# Patient Record
Sex: FEMALE | Race: BLACK OR AFRICAN AMERICAN | NOT HISPANIC OR LATINO | Employment: FULL TIME | URBAN - METROPOLITAN AREA
[De-identification: names, ages, dates, MRNs, and addresses within clinical notes are randomized per-mention and may not be internally consistent; named-entity substitution may affect disease eponyms.]

---

## 2021-11-01 ENCOUNTER — LAB REQUISITION (OUTPATIENT)
Dept: LAB | Facility: CLINIC | Age: 26
End: 2021-11-01

## 2021-11-01 PROCEDURE — 86706 HEP B SURFACE ANTIBODY: CPT | Performed by: INTERNAL MEDICINE

## 2021-11-01 PROCEDURE — 86787 VARICELLA-ZOSTER ANTIBODY: CPT | Performed by: INTERNAL MEDICINE

## 2021-11-01 PROCEDURE — 87340 HEPATITIS B SURFACE AG IA: CPT | Performed by: INTERNAL MEDICINE

## 2021-11-01 PROCEDURE — 86481 TB AG RESPONSE T-CELL SUSP: CPT | Performed by: INTERNAL MEDICINE

## 2021-11-02 LAB
HBV SURFACE AB SERPL IA-ACNC: 7.01 M[IU]/ML
HBV SURFACE AG SERPL QL IA: NONREACTIVE
QUANTIFERON MITOGEN: 10 IU/ML
QUANTIFERON NIL TUBE: 0.19 IU/ML
QUANTIFERON TB1 TUBE: 0.29 IU/ML
QUANTIFERON TB2 TUBE: 0.2
VZV IGG SER QL IA: 213 INDEX
VZV IGG SER QL IA: POSITIVE

## 2021-11-03 LAB
GAMMA INTERFERON BACKGROUND BLD IA-ACNC: 0.19 IU/ML
M TB IFN-G BLD-IMP: NEGATIVE
M TB IFN-G CD4+ BCKGRND COR BLD-ACNC: 9.81 IU/ML
MITOGEN IGNF BCKGRD COR BLD-ACNC: 0.01 IU/ML
MITOGEN IGNF BCKGRD COR BLD-ACNC: 0.1 IU/ML

## 2021-12-30 ENCOUNTER — LAB (OUTPATIENT)
Dept: LAB | Facility: CLINIC | Age: 26
End: 2021-12-30

## 2021-12-30 DIAGNOSIS — Z11.52 ENCOUNTER FOR SCREENING FOR COVID-19: Primary | ICD-10-CM

## 2021-12-30 DIAGNOSIS — Z11.52 ENCOUNTER FOR SCREENING FOR COVID-19: ICD-10-CM

## 2021-12-30 LAB — SARS-COV-2 RNA RESP QL NAA+PROBE: NEGATIVE

## 2021-12-30 PROCEDURE — U0003 INFECTIOUS AGENT DETECTION BY NUCLEIC ACID (DNA OR RNA); SEVERE ACUTE RESPIRATORY SYNDROME CORONAVIRUS 2 (SARS-COV-2) (CORONAVIRUS DISEASE [COVID-19]), AMPLIFIED PROBE TECHNIQUE, MAKING USE OF HIGH THROUGHPUT TECHNOLOGIES AS DESCRIBED BY CMS-2020-01-R: HCPCS

## 2021-12-30 PROCEDURE — U0005 INFEC AGEN DETEC AMPLI PROBE: HCPCS

## 2022-01-05 ENCOUNTER — APPOINTMENT (OUTPATIENT)
Dept: FAMILY MEDICINE | Facility: CLINIC | Age: 27
End: 2022-01-05
Payer: COMMERCIAL

## 2022-01-05 ENCOUNTER — LAB REQUISITION (OUTPATIENT)
Dept: LAB | Facility: CLINIC | Age: 27
End: 2022-01-05

## 2022-01-05 LAB — SARS-COV-2 RNA RESP QL NAA+PROBE: POSITIVE

## 2022-01-05 PROCEDURE — U0005 INFEC AGEN DETEC AMPLI PROBE: HCPCS | Performed by: INTERNAL MEDICINE

## 2022-01-12 ENCOUNTER — TELEPHONE (OUTPATIENT)
Dept: OBGYN | Facility: CLINIC | Age: 27
End: 2022-01-12
Payer: COMMERCIAL

## 2022-01-12 NOTE — LETTER
Binu Sunshine  [address]  [facility] [address]    Date: 1/12/2022    TO WHOM IT MAY CONCERN:    Binu Sunshine had recent COVID-19 infection but is cleared to travel by airplane on 1/13/2022.  She has been medically evaluated, she is asymptomatic and more than 10 days have passed since symptoms started.     Sincerely,        Kaity Barr MD   1/12/2022  1:28 PM

## 2022-01-12 NOTE — TELEPHONE ENCOUNTER
Binu Sunshine is a 26 year old who had recent breakthrough COVID 19 infection.  Symptoms started 1/3. She tested positive 1/5. She is currently asymptomatic. She plans to travel tomorrow 1/13 which will be 10 days after sx started. Letter written.   Kaity Barr MD

## 2022-02-06 ENCOUNTER — HEALTH MAINTENANCE LETTER (OUTPATIENT)
Age: 27
End: 2022-02-06

## 2022-07-01 ENCOUNTER — DOCUMENTATION ONLY (OUTPATIENT)
Dept: ORTHOPEDICS | Facility: CLINIC | Age: 27
End: 2022-07-01

## 2022-07-01 ENCOUNTER — MEDICAL CORRESPONDENCE (OUTPATIENT)
Dept: HEALTH INFORMATION MANAGEMENT | Facility: CLINIC | Age: 27
End: 2022-07-01

## 2022-07-01 ENCOUNTER — TELEPHONE (OUTPATIENT)
Dept: ORTHOPEDICS | Facility: CLINIC | Age: 27
End: 2022-07-01

## 2022-07-01 NOTE — TELEPHONE ENCOUNTER
[facility]    Phone Message:  Pt called.  Pt was seen, today, at:    [facility]    [address] [place] [address] [id number] [phone]     By:    Catrina Luu PA-C    [address] [address]    [phone] (Work)    [phone] (Fax)      Appt Notes:    Patient reports left leg weakness has been chronic for past several months, low suspicion for acute CVA today. Patient exhibited no signs of expressive aphasia, slurred speech or confusion during telephone visit today. Recommend patient schedule follow-up clinic visit within the next 24 hours to evaluate the symptoms. Patient given strict ED precautions and told to present immediately or call 911 if she experiences any worsening or strokelike symptoms.    Pt referrred to [facility]    Writer contact [facility] Priority Line. More information and follow up was needed. Writer contacted     [facility]    [address] [place] [id number] [phone]     The clinic above is going to fax [facility] the referral and Notes from today.    NO IMAGING WAS DONE TODAY.  THE PT AND THE LISTED CLINIC STATED THAT PT'S LAST IMAGING WAS A MRI, DONE ONE YEAR AGO.    Please reach out to pt if pt is appropriate for [facility] provider, or if pt is supposed to be seen elsewhere. Thank you.    May a detailed message be left on voicemail: Yes     Reason for Call: Other: LEFT Leg Weakness, Compartment Syndrome     Action Taken: Message routed to:  [facility] ([facility] Team     Travel Screening: Not Applicable

## 2022-07-01 NOTE — TELEPHONE ENCOUNTER
Writer called and talked with patient on the phone. Writer explained that at this time pt should be seen by their primary care provider and or a sports medicine non-op provider to have a work up of the limb that is having the issue. Writer stated that updated imaging would be needed such as an MRI. Pt agreed with plan and will follow up with the orthopedic team that she has worked with and have them do a work up and updated imaging. Writer then gave pt the number to the clinic and informed her to have all records and imaging sent to the clinic along with a referral. PT agreed with plan.   Writer did confirm with PA of Day and clinic manager that this is right course of action. Pt will call back if needs to be scheduled.     Jaharia Lakhani LPN

## 2022-07-01 NOTE — PROGRESS NOTES
Writer talked with brendon from call center. Pt is calling in stating is to be seen in Ortho after being seen at [facility] today to rule out a CVA. Brendon was stating the referral was for leg weakness and compartment syndrome and CVA. Pt states no new imaging was taken and writer was uncertain if a new injury occurred. Writer talked with Brendon and stated that [facility] would need to send referral and records over before scheduling pt as it is unclear if Orthopedics is the right clinic for pt to be seen in.     Jahaira Lakhani LPN

## 2022-07-06 ENCOUNTER — TRANSCRIBE ORDERS (OUTPATIENT)
Dept: OTHER | Age: 27
End: 2022-07-06

## 2022-07-06 DIAGNOSIS — M79.605 LEFT LEG PAIN: Primary | ICD-10-CM

## 2022-07-21 ENCOUNTER — PRE VISIT (OUTPATIENT)
Dept: ORTHOPEDICS | Facility: CLINIC | Age: 27
End: 2022-07-21

## 2022-07-21 ENCOUNTER — OFFICE VISIT (OUTPATIENT)
Dept: ORTHOPEDICS | Facility: CLINIC | Age: 27
End: 2022-07-21
Payer: COMMERCIAL

## 2022-07-21 VITALS — WEIGHT: 140 LBS | HEIGHT: 64 IN | BODY MASS INDEX: 23.9 KG/M2

## 2022-07-21 DIAGNOSIS — M62.81 CALF MUSCLE WEAKNESS: Primary | ICD-10-CM

## 2022-07-21 DIAGNOSIS — M79.605 LEFT LEG PAIN: ICD-10-CM

## 2022-07-21 PROCEDURE — 99203 OFFICE O/P NEW LOW 30 MIN: CPT | Mod: GC | Performed by: FAMILY MEDICINE

## 2022-07-21 NOTE — PROGRESS NOTES
ASSESSMENT/PLAN:    (M62.81) Calf muscle weakness  (primary encounter diagnosis)  Comment: exam and imaging are concerning for soleus atrophy, likely secondary to tibial nerve entrapment; will check MRI calf to further evaluate for structural cause of tibial nerve compression; if negative, would consider popliteal artery entrapment eval vs EMG; she will follow-up with me after the MRI; I will review her case w/ Dr Vazquez as well  Plan: MR Tibia Fibula Lower Leg Left wo Contrast, EMG        ([facility]), US Low Ext Arterial Dop-Seg         Pressures with Exercise          (M79.605) Left leg pain  Comment: see above   Plan: MR Tibia Fibula Lower Leg Left wo Contrast          Attestation:  This patient has been seen and evaluated by me, Brandon Felder MD with the resident, Dr Dorado and the care team. I agree with the findings and plan of care as documented in this note.    Brandon Felder MD  July 21, 2022  10:26 AM      Pt is a 26 year old female here today for:     L Lower leg pain:   Location: posterior (whole calf)   Duration: 2 years    Injury/ Inciting Activity? No -> pain w/ running and soccer; 20 minutes into run, then resolves in 10 min   Previous stress fracture? NOShin Splints? NO Compartment syndrome?NO   Swelling? YES    Weakness? YES - catie after running   Numbness/ Tingling? YES   Footdrop? NO but cannot get up on toes after running   Imaging? MRI calf - see below  Testing? LYLE - 9/24/2021: IMPRESSION   Impression: Normal ankle-brachial indices bilaterally.   Treatment? PT     MRI ankle - 7/9/2022   Impression: Diffuse fatty atrophy of the soleus muscle. The extent of atrophy suggests a long-standing process, possibly related to tibial nerve injury versus entrapment.      No past medical history on file.   No past surgical history on file.   No current outpatient medications on file.      Not on File   ROS:   Gen- no fevers/chills   Rheum - no morning stiffness   Derm - no rash/ redness   Neuro - no  numbness, no tingling   Remainder of ROS negative.     Exam:   There were no vitals taken for this visit.     L Shin/leg:   Sensation intact   Full ROM at ankle and knee   Weakness w/ plantarflexion in knee flexion, improved w/ knee extension  No fascial herniations   No TTP over tibia/ fibula   Neg hop

## 2022-07-21 NOTE — LETTER
7/21/2022      RE: Binu Sunshine  [address]  [facility] [address]     Dear Colleague,    Thank you for referring your patient, Binu Sunshine, to the [facility] [place]. Please see a copy of my visit note below.    ASSESSMENT/PLAN:    (M62.81) Calf muscle weakness  (primary encounter diagnosis)  Comment: exam and imaging are concerning for soleus atrophy, likely secondary to tibial nerve entrapment; will check MRI calf to further evaluate for structural cause of tibial nerve compression; if negative, would consider popliteal artery entrapment eval vs EMG; she will follow-up with me after the MRI; I will review her case w/ Dr Vazquez as well  Plan: MR Tibia Fibula Lower Leg Left wo Contrast, EMG        ([facility]), US Low Ext Arterial Dop-Seg         Pressures with Exercise          (M79.605) Left leg pain  Comment: see above   Plan: MR Tibia Fibula Lower Leg Left wo Contrast          Attestation:  This patient has been seen and evaluated by me, Brandon Felder MD with the resident, Dr Dorado and the care team. I agree with the findings and plan of care as documented in this note.    Brandon Felder MD  July 21, 2022  10:26 AM      Pt is a 26 year old female here today for:     L Lower leg pain:   Location: posterior (whole calf)   Duration: 2 years    Injury/ Inciting Activity? No -> pain w/ running and soccer; 20 minutes into run, then resolves in 10 min   Previous stress fracture? NOShin Splints? NO Compartment syndrome?NO   Swelling? YES    Weakness? YES - catie after running   Numbness/ Tingling? YES   Footdrop? NO but cannot get up on toes after running   Imaging? MRI calf - see below  Testing? LYLE - 9/24/2021: IMPRESSION   Impression: Normal ankle-brachial indices bilaterally.   Treatment? PT     MRI ankle - 7/9/2022   Impression: Diffuse fatty atrophy of the soleus muscle. The extent of atrophy suggests a long-standing process, possibly related to tibial  nerve injury versus entrapment.      No past medical history on file.   No past surgical history on file.   No current outpatient medications on file.      Not on File   ROS:   Gen- no fevers/chills   Rheum - no morning stiffness   Derm - no rash/ redness   Neuro - no numbness, no tingling   Remainder of ROS negative.     Exam:    There were no vitals taken for this visit.     L Shin/leg:   Sensation intact   Full ROM at ankle and knee   Weakness w/ plantarflexion in knee flexion, improved w/ knee extension  No fascial herniations   No TTP over tibia/ fibula   Neg hop         Again, thank you for allowing me to participate in the care of your patient.      Sincerely,    Brandon Felder MD

## 2022-08-08 ENCOUNTER — ANCILLARY PROCEDURE (OUTPATIENT)
Dept: MRI IMAGING | Facility: CLINIC | Age: 27
End: 2022-08-08
Attending: FAMILY MEDICINE
Payer: COMMERCIAL

## 2022-08-08 ENCOUNTER — TELEPHONE (OUTPATIENT)
Dept: ORTHOPEDICS | Facility: CLINIC | Age: 27
End: 2022-08-08

## 2022-08-08 ENCOUNTER — ANCILLARY PROCEDURE (OUTPATIENT)
Dept: ULTRASOUND IMAGING | Facility: CLINIC | Age: 27
End: 2022-08-08
Attending: FAMILY MEDICINE
Payer: COMMERCIAL

## 2022-08-08 DIAGNOSIS — M62.81 CALF MUSCLE WEAKNESS: ICD-10-CM

## 2022-08-08 DIAGNOSIS — M79.605 LEFT LEG PAIN: ICD-10-CM

## 2022-08-08 DIAGNOSIS — I77.89 POPLITEAL ARTERY ENTRAPMENT SYNDROME (H): Primary | ICD-10-CM

## 2022-08-08 PROCEDURE — 73718 MRI LOWER EXTREMITY W/O DYE: CPT | Mod: LT | Performed by: RADIOLOGY

## 2022-08-08 PROCEDURE — 93924 LWR XTR VASC STDY BILAT: CPT | Performed by: RADIOLOGY

## 2022-08-08 NOTE — TELEPHONE ENCOUNTER
[facility] Call Center    Phone Message    May a detailed message be left on voicemail: yes     Reason for Call: Order(s): Other:   Reason for requested:  Pt calling to schedule - popliteal artery entrapment eval    Date needed: 08/09/2022    Provider name: Dr. Brandon Felder    Pt had MRI / US done 08/08.  Pt called to be schedule for a popliteal artery entrapment test. No order or referral from Dr. Felder.  Pt would like call back with who she can contact to get this test done.     Pt can be reached at [phone]      Action Taken: Other: Sports Med - Dr. Brandon Felder     Travel Screening: Not Applicable

## 2022-08-09 NOTE — TELEPHONE ENCOUNTER
Team - please contact pt and inform her that the new study is ordered. I have reached out to the radiology dept to ensure the study is performed to evaluate for popliteal artery entrapment. Thanks!    Brandon Felder MD  August 9, 2022  12:56 PM

## 2022-08-09 NOTE — TELEPHONE ENCOUNTER
I left a message for the patient with the number to schedule the evaluation. Call back number was given for further questions.     Caitie, SHARYN

## 2022-08-10 ENCOUNTER — ANCILLARY PROCEDURE (OUTPATIENT)
Dept: ULTRASOUND IMAGING | Facility: CLINIC | Age: 27
End: 2022-08-10
Attending: FAMILY MEDICINE
Payer: COMMERCIAL

## 2022-08-10 DIAGNOSIS — I77.89 POPLITEAL ARTERY ENTRAPMENT SYNDROME (H): ICD-10-CM

## 2022-08-10 PROCEDURE — 93926 LOWER EXTREMITY STUDY: CPT | Mod: LT | Performed by: RADIOLOGY

## 2022-08-11 ENCOUNTER — TELEPHONE (OUTPATIENT)
Dept: ORTHOPEDICS | Facility: CLINIC | Age: 27
End: 2022-08-11

## 2022-08-11 NOTE — TELEPHONE ENCOUNTER
"Left voicemail w/ results of MRI and vascular studies. MRI shows soleus atrophy and possible tibial nerve compression at the \"soleus sling\"? I spoke with our foot/ankle surgeon Dr Vazquez and he was not familiar with this term but assumed it was at the origin of the muscle. He would like to get an EMG to confirm the level of nerve entrapment before he would proceed with a release. The vascular study was consistent w/ popliteal artery entrapment as well. I offered a vascular surgery consult vs the plan to wait on the EMG before seeing any surgeon. She will call back is she wishes for me to place a vascular consult. Otherwise, we can discuss further at her follow-up on 8/25/22.    Team - just an JENNIFER Felder MD  August 11, 2022  1:22 PM    "

## 2022-08-11 NOTE — TELEPHONE ENCOUNTER
[facility] Call Center    Phone Message:  Pt called back, returning the call of MD Emerita.     Pt would like to WAIT until her appt with MD Emerita >> on 8.25.22 >> to discuss the Vascular consult.    Thank you.    May a detailed message be left on voicemail: Yes     Reason for Call: Other: Patient Returned MD Call     Action Taken: Message routed to:  [facility] Team to MD    Travel Screening: Not Applicable

## 2022-08-22 ENCOUNTER — TELEPHONE (OUTPATIENT)
Dept: ORTHOPEDICS | Facility: CLINIC | Age: 27
End: 2022-08-22

## 2022-08-22 NOTE — TELEPHONE ENCOUNTER
[facility] Call Center    Phone Message    May a detailed message be left on voicemail: no     Reason for Call: Other: Patient returning msg. Yes she would like a referral for neurosurgery    Action Taken: Message routed to:  [facility]): DAT SPORTS    Travel Screening: Not Applicable

## 2022-08-24 NOTE — PROGRESS NOTES
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 She has also started to having cramps in her lower leg.  Only able to walk 2.5 miles   Has timing question -> Plan to start new job on Monday     Arterial U/S 8/10/22:  FINDINGS: LEFT popliteal artery     Neutral:       Above joint: 6.7 mm, 101/0 cm/s, triphasic       At joint: 7.1 mm, 88/0 cm/s, triphasic       Below joint: 5.8 mm, 52/0 cm/s, triphasic     Dorsiflexion:       Above joint: 113/0 cm/s, triphasic       At joint: 121/0 cm/s, triphasic       Below joint: 57/0 cm/s, triphasic     Plantar flexion:       Above joint: 54/0 cm/s, triphasic       At joint: 128/0 cm/s, triphasic       Below joint: 223/0 cm/s, triphasic, 193/0 cm/s, triphasic       Standin/0 cm/s, triphasic.                                                                      IMPRESSION: No occlusion demonstrated.      Velocity in the left below knee popliteal artery more than quadrupled  from neutral with plantar flexion suggesting compression and popliteal  entrapment.      MRI could be performed for better evaluation of the vascular and  muscular anatomy.     MRI L leg 2022     Findings:   External markers placed along the anterolateral and posterolateral mid  lower leg.     Mild thickening of the soleus muscle sling with focal thickening and  abnormal T2 hyperintense signal at the tibial nerve approximately 5 cm  below the tibial plateau (series [id number] image 15).     Osseous structures   Osseous structures: No fracture avascular necrosis, or focal osseous  lesion is seen.     Muscles   Muscles: Soleus muscle edema and moderate fatty atrophy. Mild  gastrocnemius muscle belly edema and moderate fatty atrophy,  especially distally.      Joint/Periarticular soft tissue     Internal derangement of joint assessment are limited owing to chosen  field of view. Physiologic amount of joint fluid is present in the  bilateral knee. Small Baker's cyst.     Other Findings:     Evaluation contralateral leg confined to large field-of-view coronal  sequences. No fracture or marrow infiltrative change. No soft tissue  mass. No evidence of muscle atrophy                                                                      Impression:   Query soleal sling tibial nerve entrapment with focal increased signal  in the tibial nerve and adjacent thickening of the soleus sling.   *  Fatty atrophy and edema of the left soleus and, to a lesser extent,  gastrocnemius muscles.    Per my last note:  (M62.81) Calf muscle weakness  (primary encounter diagnosis)  Comment: exam and imaging are concerning for soleus atrophy, likely secondary to tibial nerve entrapment; will check MRI calf to further evaluate for structural cause of tibial nerve compression; if negative, would consider popliteal artery entrapment eval vs EMG; she will follow-up with me after the MRI; I will review her case w/ Dr Vazquez as well  Plan: MR Tibia Fibula Lower Leg Left wo Contrast, EMG        ([facility]), US Low Ext Arterial Dop-Seg         Pressures with Exercise          (M79.605) Left leg pain  Comment: see above   Plan: MR Tibia Fibula  Lower Leg Left wo Contrast    No past medical history on file.   No current outpatient medications on file.      No Known Allergies   ROS:   Gen- no fevers/chills   Rheum - no morning stiffness   Derm - no rash/ redness   Neuro - no numbness, no tingling   Remainder of ROS negative.     Exam:

## 2022-08-24 NOTE — TELEPHONE ENCOUNTER
Called pt and informed her that Dr. Felder will discuss surgery referrals tomorrow 8/25/22 at her follow up visit. Pt understood and was appreciative of the call.    Neville Key ATC

## 2022-08-24 NOTE — TELEPHONE ENCOUNTER
Team - I never discussed neurosurgery with Binu. I left a message asking if she wanted to see vascular surgery or wait til she saw me discuss possible next steps. That call was on 8/11.   Given she has an appointment with me in 2 days, I will plan to discuss further at that visit.     Team - please let Binu know we will discuss referrals to surgeons at her visit on 8/25. Thanks!    Brandon Felder MD  August 23, 2022  10:25 PM

## 2022-08-25 ENCOUNTER — TELEPHONE (OUTPATIENT)
Dept: VASCULAR SURGERY | Facility: CLINIC | Age: 27
End: 2022-08-25

## 2022-08-25 ENCOUNTER — OFFICE VISIT (OUTPATIENT)
Dept: ORTHOPEDICS | Facility: CLINIC | Age: 27
End: 2022-08-25
Payer: COMMERCIAL

## 2022-08-25 VITALS — HEIGHT: 64 IN | BODY MASS INDEX: 23.9 KG/M2 | WEIGHT: 140 LBS

## 2022-08-25 DIAGNOSIS — I77.89 POPLITEAL ARTERY ENTRAPMENT SYNDROME (H): Primary | ICD-10-CM

## 2022-08-25 DIAGNOSIS — G57.40: ICD-10-CM

## 2022-08-25 PROCEDURE — 99213 OFFICE O/P EST LOW 20 MIN: CPT | Performed by: FAMILY MEDICINE

## 2022-08-25 NOTE — TELEPHONE ENCOUNTER
Pt referred to VS for popliteal entrapment. Pt did have lower extremity US completed w/ plantar flexion:    IMPRESSION: No occlusion demonstrated.      Velocity in the left below knee popliteal artery more than quadrupled  from neutral with plantar flexion suggesting compression and popliteal  entrapment.     Will have our provider review.    LEBRON BandaN, RN  [facility]  Ph: [phone]  Fax: [phone]

## 2022-08-25 NOTE — TELEPHONE ENCOUNTER
The pt is scheduled on 8/30 at the [facility] for imaging and on 9/7 for a video visit with Ashely Chaney on 8/25/2022 at 12:49 PM     The pt was on the phone during scheduling of the above appointments and agreed to the dates times and locations of the appointments.

## 2022-08-25 NOTE — Clinical Note
Jesus - very interesting case of young active lady with calf cramping found to have soleus atrophy/ tibial nerve entrapment/ popliteal artery entrapment.  I reviewed the case w/ Dr Lynn and Dr Vazquez and they both said that you may be the surgeon of choice to treat the tibial nerve issue? She still has an EMG pending in a month t determine the exact level of nerve impingement. Your thoughts? Appreciate your time. - Brandon

## 2022-08-25 NOTE — LETTER
Date:August 26, 2022      Patient was self referred, no letter generated. Do not send.        [facility] Health Information

## 2022-08-25 NOTE — TELEPHONE ENCOUNTER
Vascular Clinic Appointment Request    Imaging needed? Yes. Orders placed.     Visit type: virtual visit     Provider: Dr. Diallo    Timeframe: ASAP    Appt notes: Popliteal entrapment    Additional info: NEW slot    Documentation routed to clinic coordinators for scheduling.      LEBRON BandaN, RN  [facility]  Ph: [phone]  Fax: [phone]

## 2022-08-25 NOTE — TELEPHONE ENCOUNTER
Referring providers name, location, phone number and fax: Brandon Felder MD in [facility]    Reason for visit: Popliteal artery entrapment syndrome   Tibial nerve lesion  Pt w/ soleus atrophy secondary to tibial nerve entrapment, and popliteal artery entrapment     Does patient have a referral: Yes    Referral to specific provider? NA    Medical records or notes if possible: Epic, please review and call pt back to schedule. Thanks

## 2022-08-26 NOTE — PROGRESS NOTES
Per discussion w/ ortho colleagues, her tibial nerve lesion may require neurosurgical release based on location of compression. EMG was ordered on 7/21/22 and is not scheduled til 9/28/22. Pt's symptoms continue to worsen. Will place referral for neurosurgical consult for their opinion.    Brandon Felder MD  August 26, 2022  10:22 AM

## 2022-08-29 NOTE — TELEPHONE ENCOUNTER
Action 8/29/22 MV 11.50am   Action Taken 1) called and LVM to get verbal consent for [facility] recs  2) pt called back and gave verbal consent over the phone for [facility] recs ; recs pulled up via Care Everywhere  3) Imaging request faxed to [facility]    9/9/22 MV 11.15am  Images resolved in PACS         RECORDS RECEIVED FROM: internal   REASON FOR VISIT: Tibial nerve lesion   Date of Appt: 9/27/22   NOTES (FOR ALL VISITS) STATUS DETAILS   OFFICE NOTE from referring provider Internal Dr Brandon Felder @ [facility] Sports Med:  8/25/22 7/21/22   Other Provider Care Everywhere Dr Issac Chance @ [facility] Neurology:  9/25/20 6/25/20   MEDICATION LIST Internal    IMAGING  (FOR ALL VISITS)     EMG Care Everywhere [facility]:  8/31/20   ULTRASOUND (CAROTID BILAT) *VASCULAR* Internal  [facility]:  US Lower Extrem 8/10/22  US L Ext Arterial 8/8/22   MRI (HEAD, NECK, SPINE) Internal/Received [facility]:  MRI Tib Fib 8/8/22    [facility]:  MRI Ankle L 7/9/22  MRI Lumbar Spine 9/30/20  MRI Lumbar Spine 9/9/20  MRI Brain 7/6/20   CT (HEAD, NECK, SPINE) Internal  [facility]:  CTA Lower Extrem 8/30/22

## 2022-08-30 ENCOUNTER — ANCILLARY PROCEDURE (OUTPATIENT)
Dept: CT IMAGING | Facility: CLINIC | Age: 27
End: 2022-08-30
Attending: SURGERY
Payer: COMMERCIAL

## 2022-08-30 DIAGNOSIS — I77.89 POPLITEAL ARTERY ENTRAPMENT SYNDROME (H): ICD-10-CM

## 2022-08-30 PROCEDURE — 73706 CT ANGIO LWR EXTR W/O&W/DYE: CPT | Mod: LT | Performed by: RADIOLOGY

## 2022-08-30 RX ORDER — IOPAMIDOL 755 MG/ML
150 INJECTION, SOLUTION INTRAVASCULAR ONCE
Status: COMPLETED | OUTPATIENT
Start: 2022-08-30 | End: 2022-08-30

## 2022-08-30 RX ADMIN — IOPAMIDOL 150 ML: 755 INJECTION, SOLUTION INTRAVASCULAR at 09:10

## 2022-08-31 ENCOUNTER — MYC MEDICAL ADVICE (OUTPATIENT)
Dept: ORTHOPEDICS | Facility: CLINIC | Age: 27
End: 2022-08-31

## 2022-09-02 NOTE — TELEPHONE ENCOUNTER
DIAGNOSIS: Popliteal entrapment   DATE RECEIVED: 9.7.22   NOTES STATUS DETAILS   OFFICE NOTE from referring provider Internal 8.25.22, 7.21.22  [facility]   OFFICE NOTE from other specialist CE 7.1.22  [facility]  [facility]   MEDICATION LIST Internal    PERTINENT LABS Internal    CTA (CT ANGIOGRAPHY) Internal 8.30.22  CTA Low Ext Doug   MRI Internal 8.8.22  MR Tibia Fibula Low Left Leg    7.9.22  MR Left Ankle   ULTRASOUND Internal 8.10.22  US Low Ext Arterial Duplex Left    8.8.22  US Low Ext Arterial Doppler Doug

## 2022-09-07 ENCOUNTER — VIRTUAL VISIT (OUTPATIENT)
Dept: VASCULAR SURGERY | Facility: CLINIC | Age: 27
End: 2022-09-07
Attending: FAMILY MEDICINE
Payer: COMMERCIAL

## 2022-09-07 ENCOUNTER — PRE VISIT (OUTPATIENT)
Dept: VASCULAR SURGERY | Facility: CLINIC | Age: 27
End: 2022-09-07

## 2022-09-07 DIAGNOSIS — I77.89 POPLITEAL ARTERY ENTRAPMENT SYNDROME (H): ICD-10-CM

## 2022-09-07 DIAGNOSIS — G57.40: ICD-10-CM

## 2022-09-07 PROCEDURE — 99204 OFFICE O/P NEW MOD 45 MIN: CPT | Mod: 95 | Performed by: SURGERY

## 2022-09-07 NOTE — NURSING NOTE
Chief Complaint   Patient presents with     Consult     Patient confirms medications and allergies are accurate via patients echeck in completion, and or denies any changes since last reviewed/verified.     Hannah Dowling, Virtual Facilitator    Please Add to Medication List - IUD Placed 8/2018    Questions: How long until she could have surgery if it is popliteal entrapment, and how long til walking on before she could walk miles and now she is limited.

## 2022-09-07 NOTE — LETTER
9/7/2022       RE: Binu Sunshine  [address]  [facility] [address]     Dear Colleague,    Thank you for referring your patient, Binu Sunshine, to the [facility] [place] at [facility]. Please see a copy of my visit note below.    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  I described what this is and where the pinching occurs of the neurovascular bundle.  I briefly discussed that release of the soleus muscle and the sling it forms around the neurovascular bundle can cause this type of compression with plantarflexion.  Treatment is release of this area.  She is already scheduled for an EMG and follow-up with neurosurgery.  I asked her to reach out to me after these are complete to know how she would like to proceed.  I also encouraged her to reach out if any additional questions arise.  I look forward to hearing follow-up on her EMG studies.    Many thanks for involving me in the care of this very pleasant patient. Should any questions or concerns arise, please don't hesitate to contact me.    Warm Regards,    Flavia Diallo MD, DFSVS, RPVI  Director, [facility] Vascular Services  Professor and Chief, Vascular and Endovascular Surgery  [facility]  Pager: 1. Send message or 10 digit call back number Securely via [employer] with the Vocera Web Console (learn more here)              2. Outside of [facility]? Call [phone]      45 minutes spent on the date of the encounter doing chart review, review of outside  records, review of test results, interpretation of tests, patient visit and documentation       HPI: Bniu is a very pleasant 26-year-old female seen on video virtual visit today for left leg weakness.  This all started when she developed COVID and approximately 4 months later she began noticing significant left lower extremity weakness.  She notes her toes will go numb when she tries to do any type of walking.  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  She denies any specific trauma to the lower extremities.  She continues to have ongoing claudication and underwent a duplex of the left lower extremity showing a quadrupling of the velocities below the knee with plantar foot flexion.      Review of Systems   Constitutional, HEENT, cardiovascular, pulmonary, GI, , musculoskeletal, neuro, skin, endocrine and psych systems are negative, except as otherwise noted.    Physical Exam   GENERAL: Healthy, alert and no distress  EYES: Eyes grossly normal to inspection.  No discharge or erythema, or obvious scleral/conjunctival abnormalities.  RESP: No audible wheeze, cough, or visible cyanosis.  No visible retractions or increased work of breathing.    SKIN: Visible skin clear. No significant rash, abnormal pigmentation or lesions.  NEURO: Cranial nerves grossly intact.  Mentation and speech appropriate for age.  PSYCH: Mentation appears normal, affect normal/bright, judgement and insight intact, normal speech and appearance well-groomed.    Imaging: Velocities in the below-knee popliteal artery quadruple with plantarflexion.  There is also a suggestion of compression at the soleal level on MRI.    Video Start Time: 12:30 PM  Video End Time: 1 PM      Sincerely,  Flavia Diallo MD

## 2022-09-07 NOTE — PROGRESS NOTES
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  I described what this is and where the pinching occurs of the neurovascular bundle.  I briefly discussed that release of the soleus muscle and the sling it forms around the neurovascular bundle can cause this type of compression with plantarflexion.  Treatment is release of this area.  She is already scheduled for an EMG and follow-up with neurosurgery.  I asked her to reach out to me after these are complete to know how she would like to proceed.  I also encouraged her to reach out if any additional questions arise.  I look forward to hearing follow-up on her EMG studies.    Many thanks for involving me in the care of this very pleasant patient. Should any questions or concerns arise, please don't hesitate to contact me.    Warm Regards,    Flavia Diallo MD, DFSVS, RPVI  Director, [facility] Vascular Services  Professor and Chief, Vascular and Endovascular Surgery  [facility]  Pager: 1. Send message or 10 digit call back number Securely via [employer] with the Vocera Web Console (learn more here)              2. Outside of [facility]? Call [phone]        45 minutes spent on the date of the encounter doing chart review, review of outside records, review of test results, interpretation of tests, patient visit and documentation           HPI: Binu is a very pleasant 26-year-old female seen on video virtual visit today for left leg weakness.  This all started when she developed COVID and approximately 4 months later she began noticing significant left  lower extremity weakness.  She notes her toes will go numb when she tries to do any type of walking.  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  She denies any specific trauma to the lower extremities.  She continues to have ongoing claudication and underwent a duplex of the left lower extremity showing a quadrupling of the velocities below the knee with plantar foot flexion.      Review of Systems   Constitutional, HEENT, cardiovascular, pulmonary, GI, , musculoskeletal, neuro, skin, endocrine and psych systems are negative, except as otherwise noted.    Physical Exam   GENERAL: Healthy, alert and no distress  EYES: Eyes grossly normal to inspection.  No discharge or erythema, or obvious scleral/conjunctival abnormalities.  RESP: No audible wheeze, cough, or visible cyanosis.  No visible retractions or increased work of breathing.    SKIN: Visible skin clear. No significant rash, abnormal pigmentation or lesions.  NEURO: Cranial nerves grossly intact.  Mentation and speech appropriate for age.  PSYCH: Mentation appears normal, affect normal/bright, judgement and insight intact, normal speech and appearance well-groomed.    Imaging: Velocities in the below-knee popliteal artery quadruple with plantarflexion.  There is also a suggestion of compression at the soleal level on MRI.    Video Start Time: 12:30 PM  Video End Time: 1 PM        Binu is a 26 year old who is being evaluated via a billable video visit.      How would you like to obtain your AVS? MyChart  If the video visit is dropped, the invitation should be resent by: Text to cell phone: [phone]  Will anyone else be joining your video visit? No   Patient states is currently in the [facility]: Yes     Joselyn Taylor/[place], 9/7/2022     Originating Location (pt. Location): Home    Distant Location (provider location):  [facility] VASCULAR [facility]INIC [facility]  used for Video Visit: [place]

## 2022-09-27 ENCOUNTER — PRE VISIT (OUTPATIENT)
Dept: NEUROSURGERY | Facility: CLINIC | Age: 27
End: 2022-09-27

## 2022-09-28 ENCOUNTER — OFFICE VISIT (OUTPATIENT)
Dept: NEUROLOGY | Facility: CLINIC | Age: 27
End: 2022-09-28
Attending: FAMILY MEDICINE
Payer: COMMERCIAL

## 2022-09-28 DIAGNOSIS — M62.81 CALF MUSCLE WEAKNESS: ICD-10-CM

## 2022-09-28 PROCEDURE — 95886 MUSC TEST DONE W/N TEST COMP: CPT | Performed by: PSYCHIATRY & NEUROLOGY

## 2022-09-28 PROCEDURE — 95909 NRV CNDJ TST 5-6 STUDIES: CPT | Performed by: PSYCHIATRY & NEUROLOGY

## 2022-09-28 NOTE — PROGRESS NOTES
[facility]  Electrodiagnostic Laboratory                 Department of Neurology                                                                                                         Test Date:  2022    Patient: Binu Sunshine : 1995 Physician: Leon Elmore MD   Sex: Female AGE: 26 year Ref Phys:    ID#: [id number]   Technician: Jaden Gastelum     Clinical Information:  Binu Sunshine is a 26 year old woman with weakness in the left foot and discomfort in the calf. Examination demonstrates weakness of ankle dorsiflexion, although imaging is notable for signal change in the soleus muscle. She is referred for consideration of possible tibial neuropathy.    Techniques:  Motor conduction studies were done with surface recording electrodes. Sensory conduction studies were performed with surface electrodes, unless indicated otherwise by (n), designating the use of subdermal recording electrodes. Temperature was monitored and recorded throughout the study. Upper extremities were maintained at a temperature of 32 degrees Centigrade or higher.  EMG was done with a concentric needle electrode.     Results:  Bilateral sural and left superficial fibular sensory conduction studies were normal. Left fibular and tibial motor and F-response studies were normal. Of note, fibular F-persistence was the same on the right as on the left. Electromyography demonstrated sparse fibrillation potentials in the fibularis longus, medial gastrocnemius, vastus medialis, and soleus muscles. Voluntary activation demonstrated evidence of motor unit remodeling in several muscles as indicated in the table.    Interpretation:  This is an abnormal study, demonstrating electrophysiologic findings most supportive of a diagnosis of lumbosacral polyradiculopathy, anterior myelopathy, or, perhaps less likely, motor neuropathy or plexopathy. The condition is likely  longstanding.    _____________________________  Leon Elmore MD  Board Certified in Clinical Neurophysiology and Neuromuscular Medicine        Nerve Conduction Studies  Motor Sites      Latency Amplitude Neg. Amp Diff Segment Distance Velocity Neg. Dur Neg Area Diff Temperature Comment   Site (ms) Norm (mV) Norm %  cm m/s Norm ms %  C    Left Fibular (EDB) Motor   Ankle 5.5  < 6.0 5.2  > 2.5  Ankle-EDB 8   6.3  33.6    Bel Fib Head 10.8 - 5.4 - 3.8 Bel Fib Head-Ankle 29.9 56  > 38 6.4 4.8 34.1    Pop Fossa 11.9 - 5.5 - 1.85 Pop Fossa-Bel Fib Head 5.8 53  > 38 6.3 0 34.2    Left Tibial (AHB) Motor   Ankle 4.2  < 6.5 4.9  > 4.4  Ankle-AHB 8   7.2  34    Knee 10.6 - 3.5 - -28.6 Knee-Ankle 33.3 52  > 38 9.0 -33.3 33.3      F-Wave Sites      Min F-Lat Max-Min F-Lat Mean F-Lat   Site (ms) ms ms   Left Fibular F-Wave   Ankle 36.9 - -   Right Fibular F-Wave   Ankle 39.7 0 -   Left Tibial F-Wave   Ankle 39.2 8.3 -     Sensory Sites      Onset Lat Peak Lat Amp (O-P) Amp (P-P) Segment Distance Velocity Temperature Comment   Site ms ms  V Norm  V  cm m/s Norm  C    Left Superficial Fibular Sensory   14 cm-Ankle 2.1 2.9 37  > 3 42 14 cm-Ankle 12.5 60  > 38 32.3    Left Sural Sensory   Calf-Lat Mall 2.9 3.6 25  > 5 23 Calf-Lat Mall 14 48  > 38 32.5    Right Sural Sensory   Calf-Lat Mall 3.0 3.7 15  > 5 15 Calf-Lat Mall 14 47  > 38 32.3        Electromyography     Side Muscle Ins Act Fibs/PSW Fasc HF Amp Dur Poly Recrt Int Pat   Left Tib Anterior Nml None 1+ 0 Nml Nml 0 Nml Nml   Left Fib Longus Nml 1+ Nml 0 Nml Nml 0 ModRed Nml   Left Peroneus Tertius Nml None Nml 0 1+ 1+ 0 ModRed Nml   Left Gastroc MH Nml 1+ Nml 0 Nml Nml 0 Nml Nml   Left Tib Posterior Nml None Nml 0 Nml Nml 0 Nml Nml   Left Vastus Lat Nml None Nml 0 1+ 1+ 0 ModRed Nml   Left Add Longus Nml None Nml 0 Nml Nml 0 Nml Nml   Left Vastus Med Nml 1+ Nml 0 1+ 1+ 0 ModRed Nml   Left Biceps Fem SH Nml None Nml 0 Nml Nml 0 Nml Nml   Left Biceps Fem LH Nml None Nml 0 1+ 1+  1+ ModRed Nml   Left Gluteus Med Nml None Nml 0 Nml Nml 0 Nml Nml   Left L4 Parasp Nml None Nml 0        Left L5 Parasp Nml None Nml 0        Left S1 Parasp Nml None Nml 0        Left Soleus Nml 1+ Nml 0 1+ 1+ 1+ River Nml         NCS Waveforms:    Motor         F-Wave           Sensory

## 2022-09-28 NOTE — LETTER
2022       RE: Binu Sunshine  [address]  [facility] [address]     Dear Colleague,    Thank you for referring your patient, Binu Sunshine, to the [facility] EMG CLINIC [place] at [facility]. Please see a copy of my visit note below.                        [facility]  Electrodiagnostic Laboratory                 Department of Neurology                                                                                                         Test Date:  2022    Patient: Binu Sunshine : 1995 Physician: Leon Elmore MD   Sex: Female AGE: 26 year Ref Phys:    ID#: [id number]   Technician: Jaden Gastelum     Clinical Information:  Binu Sunshine is a 26 year old woman with weakness in the left foot and discomfort in the calf. Examination demonstrates weakness of ankle dorsiflexion, although imaging is notable for signal change in the soleus muscle. She is referred for consideration of possible tibial neuropathy.    Techniques:  Motor conduction studies were done with surface recording electrodes. Sensory conduction studies were performed with surface electrodes, unless indicated otherwise by (n), designating the use of subdermal recording electrodes. Temperature was monitored and recorded throughout the study. Upper extremities were maintained at a temperature of 32 degrees Centigrade or higher.  EMG was done with a concentric needle electrode.     Results:  Bilateral sural and left superficial fibular sensory conduction studies were normal. Left fibular and tibial motor and F-response studies were normal. Of note, fibular F-persistence was the same on the right as on the left. Electromyography demonstrated sparse fibrillation potentials in the fibularis longus, medial gastrocnemius, vastus medialis, and soleus muscles. Voluntary activation demonstrated evidence of motor unit remodeling in several muscles as indicated  in the table.    Interpretation:  This is an abnormal study, demonstrating electrophysiologic findings most supportive of a diagnosis of lumbosacral polyradiculopathy, anterior myelopathy, or, perhaps less likely, motor neuropathy or plexopathy. The condition is likely longstanding.    _____________________________  Leon Elmore MD  Board Certified in Clinical Neurophysiology and Neuromuscular Medicine        Nerve Conduction Studies  Motor Sites      Latency Amplitude Neg. Amp Diff Segment Distance Velocity Neg. Dur Neg Area Diff Temperature Comment   Site (ms) Norm (mV) Norm %  cm m/s Norm ms %  C    Left Fibular (EDB) Motor   Ankle 5.5  < 6.0 5.2  > 2.5  Ankle-EDB 8   6.3  33.6    Bel Fib Head 10.8 - 5.4 - 3.8 Bel Fib Head-Ankle 29.9 56  > 38 6.4 4.8 34.1    Pop Fossa 11.9 - 5.5 - 1.85 Pop Fossa-Bel Fib Head 5.8 53  > 38 6.3 0 34.2    Left Tibial (AHB) Motor   Ankle 4.2  < 6.5 4.9  > 4.4  Ankle-AHB 8   7.2  34    Knee 10.6 - 3.5 - -28.6 Knee-Ankle 33.3 52  > 38 9.0 -33.3 33.3      F-Wave Sites      Min F-Lat Max-Min F-Lat Mean F-Lat   Site (ms) ms ms   Left Fibular F-Wave   Ankle 36.9 - -   Right Fibular F-Wave   Ankle 39.7 0 -   Left Tibial F-Wave   Ankle 39.2 8.3 -     Sensory Sites      Onset Lat Peak Lat Amp (O-P) Amp (P-P) Segment Distance Velocity Temperature Comment   Site ms ms  V Norm  V  cm m/s Norm  C    Left Superficial Fibular Sensory   14 cm-Ankle 2.1 2.9 37  > 3 42 14 cm-Ankle 12.5 60  > 38 32.3    Left Sural Sensory   Calf-Lat Mall 2.9 3.6 25  > 5 23 Calf-Lat Mall 14 48  > 38 32.5    Right Sural Sensory   Calf-Lat Mall 3.0 3.7 15  > 5 15 Calf-Lat Mall 14 47  > 38 32.3        Electromyography     Side Muscle Ins Act Fibs/PSW Fasc HF Amp Dur Poly Recrt Int Pat   Left Tib Anterior Nml None 1+ 0 Nml Nml 0 Nml Nml   Left Fib Longus Nml 1+ Nml 0 Nml Nml 0 ModRed Nml   Left Peroneus Tertius Nml None Nml 0 1+ 1+ 0 ModRed Nml   Left Gastroc MH Nml 1+ Nml 0 Nml Nml 0 Nml Nml   Left Tib Posterior Nml None Nml  0 Nml Nml 0 Nml Nml   Left Vastus Lat Nml None Nml 0 1+ 1+ 0 ModRed Nml   Left Add Longus Nml None Nml 0 Nml Nml 0 Nml Nml   Left Vastus Med Nml 1+ Nml 0 1+ 1+ 0 ModRed Nml   Left Biceps Fem SH Nml None Nml 0 Nml Nml 0 Nml Nml   Left Biceps Fem LH Nml None Nml 0 1+ 1+ 1+ ModRed Nml   Left Gluteus Med Nml None Nml 0 Nml Nml 0 Nml Nml   Left L4 Parasp Nml None Nml 0        Left L5 Parasp Nml None Nml 0        Left S1 Parasp Nml None Nml 0        Left Soleus Nml 1+ Nml 0 1+ 1+ 1+ River Nml         NCS Waveforms:    Motor         F-Wave           Sensory                       Sincerely,    Leon Elmore MD

## 2022-10-03 ENCOUNTER — HEALTH MAINTENANCE LETTER (OUTPATIENT)
Age: 27
End: 2022-10-03

## 2022-10-04 ENCOUNTER — MYC MEDICAL ADVICE (OUTPATIENT)
Dept: VASCULAR SURGERY | Facility: CLINIC | Age: 27
End: 2022-10-04

## 2022-10-05 ASSESSMENT — ENCOUNTER SYMPTOMS
JOINT SWELLING: 0
DISTURBANCES IN COORDINATION: 0
TINGLING: 1
MUSCLE CRAMPS: 1
SEIZURES: 0
TREMORS: 0
NUMBNESS: 1
LOSS OF CONSCIOUSNESS: 0
STIFFNESS: 0
ARTHRALGIAS: 0
HEADACHES: 1
MYALGIAS: 1
MEMORY LOSS: 0
NECK PAIN: 0
MUSCLE WEAKNESS: 1
BACK PAIN: 0
SPEECH CHANGE: 0
DIZZINESS: 0
PARALYSIS: 0
WEAKNESS: 1

## 2022-10-11 ENCOUNTER — OFFICE VISIT (OUTPATIENT)
Dept: NEUROSURGERY | Facility: CLINIC | Age: 27
End: 2022-10-11
Attending: FAMILY MEDICINE
Payer: COMMERCIAL

## 2022-10-11 VITALS
HEART RATE: 79 BPM | RESPIRATION RATE: 16 BRPM | SYSTOLIC BLOOD PRESSURE: 110 MMHG | OXYGEN SATURATION: 100 % | DIASTOLIC BLOOD PRESSURE: 80 MMHG

## 2022-10-11 DIAGNOSIS — G57.40: ICD-10-CM

## 2022-10-11 PROCEDURE — 99204 OFFICE O/P NEW MOD 45 MIN: CPT | Performed by: NEUROLOGICAL SURGERY

## 2022-10-11 ASSESSMENT — PAIN SCALES - GENERAL: PAINLEVEL: MODERATE PAIN (4)

## 2022-10-11 NOTE — PROGRESS NOTES
Service Date: 10/11/2022    Brandon Felder MD  [facility] Sports Medicine  [address], [address]th [place] [address]    RE:      Binu Sunshine  MRN:  [id number]  :   1995    Dear Dr. Felder:    I had the opportunity to see Ms. Sunshine today in my clinic.  As you know, she is a 27-year-old woman who has had COVID multiple times; in fact, she caught it before she had the vaccination at the onset of the epidemic. At that time, they did not have the vaccine.  She has subsequently gotten the vaccine and the booster.  She has noticed that in the past year plus, she has developed large calves, particularly on the left side, to the point where it has been limiting her walking.  She has been getting progressively worse.  At one point, she was walking only about 600 steps.  She used to be extremely active, a , lifts weights, walker, which she can do for miles on end.  Currently with the symptoms, she has difficulty walking around class and also difficulty standing during laboratory course work.  She has had 2 EMGs.  They have been nonlocalizing.  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  This is in keeping with her symptoms.  These consist of pain in the past, difficulty pushing off, and numbness on the bottom of her foot.    On examination, her left calf is noticeably larger than her right calf.  Her dorsiflexion is strong and plantar flexion is strong bilaterally.  Sensation is normal to light touch bilaterally.  The left leg does feel more boggy compared to the right side.    Her symptoms certainly point to a tibial compression syndrome; however, if this could be related to COVID is unknown.  I certainly feel with primarily a muscle issue with fatty infiltration, not much treatment can be rendered from that regard.  However, a release of the soleal band may give her relief.  I told her that this procedure may not help her.  Her  symptoms may be related to the COVID itself on the nerve.  I told her that there are certain risks including injury to the muscle as we approach this nerve.  This, however, could be done as an outpatient surgery, and I went over the risks and benefits of the surgery with her.  She understands and will think about it.    Sincerely,          Mika Hanna MD        D: 10/11/2022   T: 10/11/2022   MT: al    Name:     TANIA GRESHAMCarmenza  MRN:      [id number]        Account:      [id number]   :      1995           Service Date: 10/11/2022       Document: [id number]

## 2022-10-11 NOTE — PATIENT INSTRUCTIONS
Thank you for choosing [facility]. You were seen in the Neurosurgery Clinic today with Dr. Hanna.    Next steps:  Dr. Hanna has offered surgery for artery entrapment. If you would like to pursue surgery, please give us a call at the neurosurgery clinic.    Please don't hesitate to reach out via MyChart or telephone if you have any questions after your visit.    Amita Edge RN Care Coordinator  Neurosurgery Clinic: [phone]

## 2022-10-11 NOTE — LETTER
10/11/2022       RE: Binu Sunshine  [address]  [facility] [address]     Dear Colleague,    Thank you for referring your patient, Binu Sunshine, to the [facility] NEUROSURGERY CLINIC [place] at [facility]. Please see a copy of my visit note below.    Service Date: 10/11/2022    Brandon Felder MD  [facility] Sports Medicine  [address], [address] [place] [address]    RE:      Binu Sunshine  MRN:  [id number]  :   1995    Dear Dr. Felder:    I had the opportunity to see Ms. Sunshine today in my clinic.  As you know, she is a 27-year-old woman who has had COVID multiple times; in fact, she caught it before she had the vaccination at the onset of the epidemic. At that time, they did not have the vaccine.  She has subsequently gotten the vaccine and the booster.  She has noticed that in the past year plus, she has developed large calves, particularly on the left side, to the point where it has been limiting her walking.  She has been getting progressively worse.  At one point, she was walking only about 600 steps.  She used to be extremely active, a , lifts weights, walker, which she can do for miles on end.  Currently with the symptoms, she has difficulty walking around class and also difficulty standing during laboratory course work.  She has had 2 EMGs.  They have been nonlocalizing.  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  This is in keeping with her symptoms.  These consist of pain in the past, difficulty pushing off, and numbness on the bottom of her foot.    On examination, her left calf is noticeably larger than her right calf.  Her dorsiflexion is strong and plantar flexion is strong bilaterally.  Sensation is normal to light touch bilaterally.  The left leg does feel more boggy compared to the right side.    Her symptoms certainly point to  a tibial compression syndrome; however, if this could be related to COVID is unknown.  I certainly feel with primarily a muscle issue with fatty infiltration, not much treatment can be rendered from that regard.  However, a release of the soleal band may give her relief.  I told her that this procedure may not help her.  Her symptoms may be related to the COVID itself on the nerve.  I told her that there are certain risks including injury to the muscle as we approach this nerve.  This, however, could be done as an outpatient surgery, and I went over the risks and benefits of the surgery with her.  She understands and will think about it.      D: 10/11/2022   T: 10/11/2022   MT: al    Name:     TANIA GRESHAM  MRN:      [id number]        Account:      [id number]   :      1995           Service Date: 10/11/2022       Document: [id number]        Again, thank you for allowing me to participate in the care of your patient.      Sincerely,    Mika Hanna MD

## 2022-10-12 ENCOUNTER — VIRTUAL VISIT (OUTPATIENT)
Dept: VASCULAR SURGERY | Facility: CLINIC | Age: 27
End: 2022-10-12
Payer: COMMERCIAL

## 2022-10-12 ENCOUNTER — TELEPHONE (OUTPATIENT)
Dept: VASCULAR SURGERY | Facility: CLINIC | Age: 27
End: 2022-10-12

## 2022-10-12 DIAGNOSIS — I77.89 POPLITEAL ARTERY ENTRAPMENT SYNDROME (H): Primary | ICD-10-CM

## 2022-10-12 DIAGNOSIS — S84.02XD INJURY OF LEFT TIBIAL NERVE, SUBSEQUENT ENCOUNTER: ICD-10-CM

## 2022-10-12 PROCEDURE — 99215 OFFICE O/P EST HI 40 MIN: CPT | Mod: 95 | Performed by: SURGERY

## 2022-10-12 RX ORDER — CEFAZOLIN SODIUM 2 G/50ML
2 SOLUTION INTRAVENOUS SEE ADMIN INSTRUCTIONS
Status: CANCELLED | OUTPATIENT
Start: 2022-10-12

## 2022-10-12 RX ORDER — CEFAZOLIN SODIUM 2 G/50ML
2 SOLUTION INTRAVENOUS
Status: CANCELLED | OUTPATIENT
Start: 2022-10-12

## 2022-10-12 NOTE — PROGRESS NOTES
Vascular Surgery History and Physical          Patient:  Binu Sunshine   Date of birth 1995, Medical record number [id number]  Date of Visit:  10/12/2022  Consult Requester:No att. 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  I have recommended takedown of the soleal sling via a medial approach below the knee as an outpatient.  We discussed there could still be some ongoing issues with her muscle and that physical therapy would be essential going forward.  We will plan to begin this 10 to 14 days after the procedure.  We also discussed that there may be nerve damage that could result in long-lasting challenges for her.  She is anxious to proceed and we will plan in the near future.    Many thanks for involving me in the care of this very pleasant patient. Should any questions or concerns arise, please don't hesitate to contact me.    Warm Regards,    Flavia Diallo MD, DFSVS, RPVI  Director, [facility] Vascular Services  Professor and Chief, Vascular and Endovascular Surgery  [facility]  Pager: 1. Send message or 10 digit call back number Securely via [employer] with the Vocera Web Console (learn more here)              2. Outside of [facility]? Call [phone]        45 minutes spent on the date of the encounter doing chart review, review of outside records, review of test results, interpretation of tests, patient visit, documentation and discussion with other provider(s)           HPI: Binu returns today for follow-up.  She had an EMG and saw neurosurgery who is in agreement regarding soleal band syndrome and compression of her tibial nerve and the likely associated compression on her distal popliteal artery.        From my September note:  Binu is a very pleasant 26-year-old female seen  on video virtual visit today for left leg weakness.  This all started when she developed COVID and approximately 4 months later she began noticing significant left lower extremity weakness.  She notes her toes will go numb when she tries to do any type of walking.  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  She denies any specific trauma to the lower extremities.  She continues to have ongoing claudication and underwent a duplex of the left lower extremity showing a quadrupling of the velocities below the knee with plantar foot flexion.           Review of Systems:   CONSTITUTIONAL:  No fevers or chills  EYES: negative for icterus  ENT:  negative for hearing loss, tinnitus and sore throat  RESPIRATORY:  negative for cough with sputum and dyspnea  CARDIOVASCULAR:  negative for chest pain, dyspnea  GASTROINTESTINAL:  negative for nausea, vomiting, diarrhea and constipation  GENITOURINARY:  negative for dysuria  HEME:  No easy bruising  INTEGUMENT:  negative for rash and pruritus  NEURO:  Negative for headache           Past Medical History:   No past medical history on file.         Past Surgical History:   No past surgical history on file.         Family History:   No family history on file.         Social History:     Social History     Tobacco Use     Smoking status: Never     Smokeless tobacco: Never   Substance Use Topics     Alcohol use: Not on file     History   Sexual Activity     Sexual activity: Not on file            Current Medications (antimicrobials listed in bold):            Allergies:   No Known Allergies         Physical Exam:   Vitals were reviewed  No data found.  Ranges for his vital signs:       Physical Examination:  GENERAL:  well-developed, well-nourished, in bed in no acute distress.   HEENT:  Head is normocephalic, atraumatic   EYES:  Eyes have anicteric sclerae without conjunctival injection or stigmata of endocarditis.    ENT:   Oropharynx is moist without exudates or ulcers. Tongue is midline  NECK:  Supple. No  Cervical lymphadenopathy  LUNGS:  Clear to auscultation bilateral.   CARDIOVASCULAR:  Regular rate and rhythm with no murmurs, gallops or rubs.  ABDOMEN:  Normal bowel sounds, soft, nontender. No appreciable pulsatile masses.  SKIN:  No acute rashes.  No stigmata of endocarditis.  NEUROLOGIC:  Grossly nonfocal. Active x4 extremities  PULSES: Deferred for virtual visit today.         Laboratory Data:     Hematology Studies    No lab results found.    Metabolic Studies   No lab results found.    Imagin/22 Arterial Duplex study:    Velocity in the left below knee popliteal artery more than quadrupled  from neutral with plantar flexion suggesting compression and popliteal  entrapment.          Binu is a 27 year old who is being evaluated via a billable video visit.      How would you like to obtain your AVS? MyChart  If the video visit is dropped, the invitation should be resent by: Text to cell phone: [phone]        Will anyone else be joining your video visit? No   Patient states is currently in the [facility]: Yes    Joselyn Taylor/CMA, 10/12/2022         Video-Visit Details    Video Start Time: 8:30 AM    Type of service:  Video Visit    Video End Time:9 AM    Originating Location (pt. Location): Home    Distant Location (provider location):  [facility] VASCULAR [facility] [place]     Platform used for Video Visit: [employer]

## 2022-10-12 NOTE — PATIENT INSTRUCTIONS
Thank you so much for choosing us for your care. It was a pleasure to see you at the vascular clinic today.     Follow-up recommendations: We will plan for entrapment surgery in the coming weeks. Our surgical scheduler Pam will get in touch with you soon to determine a surgery date and will provide you with detailed pre-op instructions.    Additional testing/imaging ordered today: None      Our scheduling team will get in touch with you to set up any follow-up testing/imaging and/or appointments. Please be aware that any testing/imaging recommended today will need to completed prior to your next visit with the provider. If testing/imaging is not completed prior to your next visit, your visit may be rescheduled.        If you have any questions, please call Brittany Salgado RN at [phone] or contact our clinic at [phone]. We also encourage the use of [email or web address] to communicate with your HealthCare Provider.      If you have an urgent need after business hours (8:00 am to 4:30 pm) please call [phone], option 4, and ask for the vascular attending on call. For non-urgent after hours needs, please call the vascular nurse at [phone] and leave a detailed voicemail. For scheduling needs, please call our clinic directly at [phone].    Pre-op Instructions    You will be contacted with the official time and date of surgery by our surgery scheduler. Surgery will be at the [facility] at [address] in [place].    Please do not eat 8 hours prior to your surgery. You may have clear liquids until your check in time. You should have a physical done by your PCP no more than 30 days prior to surgery. You will also be contacted by the COVID testing team to coordinate a COVID test 72-96 hours prior to surgery. Please shower the night before with a non scented antibacterial soap to reduce your risk of surgical site infection. Let us know if you are not feeling  well the week of surgery as this would impact our decision to put you under general anesthesia.    You will receive a phone call 24 hours prior to your surgery from a pre-op nurse who will help answer any last minute questions and provide further instructions for your day of surgery.    If you are on anticoagulants (Eliquis, Plavix, Coumadin, etc), a nurse will be contacting you with instructions on holding these medications prior to surgery. If you take a daily baby aspirin, you will CONTINUE your aspirin, including on the day of your surgery.

## 2022-10-12 NOTE — LETTER
Date:October 13, 2022      Provider requested that no letter be sent. Do not send.       [facility]

## 2022-10-12 NOTE — H&P (VIEW-ONLY)
Vascular Surgery History and Physical          Patient:  Binu Sunshine   Date of birth 1995, Medical record number [id number]  Date of Visit:  10/12/2022  Consult Requester:No att. 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  I have recommended takedown of the soleal sling via a medial approach below the knee as an outpatient.  We discussed there could still be some ongoing issues with her muscle and that physical therapy would be essential going forward.  We will plan to begin this 10 to 14 days after the procedure.  We also discussed that there may be nerve damage that could result in long-lasting challenges for her.  She is anxious to proceed and we will plan in the near future.    Many thanks for involving me in the care of this very pleasant patient. Should any questions or concerns arise, please don't hesitate to contact me.    Warm Regards,    Flavia Diallo MD, DFSVS, RPVI  Director, [facility] Vascular Services  Professor and Chief, Vascular and Endovascular Surgery  [facility]  Pager: 1. Send message or 10 digit call back number Securely via [email or web address] with the Vocera Web Console (learn more here)              2. Outside of [facility]? Call [phone]        45 minutes spent on the date of the encounter doing chart review, review of outside records, review of test results, interpretation of tests, patient visit, documentation and discussion with other provider(s)           HPI: Binu returns today for follow-up.  She had an EMG and saw neurosurgery who is in agreement regarding soleal band syndrome and compression of her tibial nerve and the likely associated compression on her distal popliteal artery.        From my September note:  Binu is a very pleasant 26-year-old female seen  on video virtual visit today for left leg weakness.  This all started when she developed COVID and approximately 4 months later she began noticing significant left lower extremity weakness.  She notes her toes will go numb when she tries to do any type of walking.  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  She denies any specific trauma to the lower extremities.  She continues to have ongoing claudication and underwent a duplex of the left lower extremity showing a quadrupling of the velocities below the knee with plantar foot flexion.           Review of Systems:   CONSTITUTIONAL:  No fevers or chills  EYES: negative for icterus  ENT:  negative for hearing loss, tinnitus and sore throat  RESPIRATORY:  negative for cough with sputum and dyspnea  CARDIOVASCULAR:  negative for chest pain, dyspnea  GASTROINTESTINAL:  negative for nausea, vomiting, diarrhea and constipation  GENITOURINARY:  negative for dysuria  HEME:  No easy bruising  INTEGUMENT:  negative for rash and pruritus  NEURO:  Negative for headache           Past Medical History:   No past medical history on file.         Past Surgical History:   No past surgical history on file.         Family History:   No family history on file.         Social History:     Social History     Tobacco Use     Smoking status: Never     Smokeless tobacco: Never   Substance Use Topics     Alcohol use: Not on file     History   Sexual Activity     Sexual activity: Not on file            Current Medications (antimicrobials listed in bold):            Allergies:   No Known Allergies         Physical Exam:   Vitals were reviewed  No data found.  Ranges for his vital signs:       Physical Examination:  GENERAL:  well-developed, well-nourished, in bed in no acute distress.   HEENT:  Head is normocephalic, atraumatic   EYES:  Eyes have anicteric sclerae without conjunctival injection or stigmata of endocarditis.    ENT:   Oropharynx is moist without exudates or ulcers. Tongue is midline  NECK:  Supple. No  Cervical lymphadenopathy  LUNGS:  Clear to auscultation bilateral.   CARDIOVASCULAR:  Regular rate and rhythm with no murmurs, gallops or rubs.  ABDOMEN:  Normal bowel sounds, soft, nontender. No appreciable pulsatile masses.  SKIN:  No acute rashes.  No stigmata of endocarditis.  NEUROLOGIC:  Grossly nonfocal. Active x4 extremities  PULSES: Deferred for virtual visit today.         Laboratory Data:     Hematology Studies    No lab results found.    Metabolic Studies   No lab results found.    Imagin/22 Arterial Duplex study:    Velocity in the left below knee popliteal artery more than quadrupled  from neutral with plantar flexion suggesting compression and popliteal  entrapment.          Binu is a 27 year old who is being evaluated via a billable video visit.      How would you like to obtain your AVS? MyChart  If the video visit is dropped, the invitation should be resent by: Text to cell phone: [phone]        Will anyone else be joining your video visit? No   Patient states is currently in the [facility]: Yes    Joselyn Taylor/CMA, 10/12/2022         Video-Visit Details    Video Start Time: 8:30 AM    Type of service:  Video Visit    Video End Time:9 AM    Originating Location (pt. Location): Home    Distant Location (provider location):  [facility]ASCULAR CL[facility] [place]     Platform used for Video Visit: [employer]

## 2022-10-12 NOTE — LETTER
10/12/2022       RE: Binu Sunshine  [address]  [place] [address]     Dear Colleague,    Thank you for referring your patient, Binu Sunshine, to the [facility] [place] at [facility]. Please see a copy of my visit note below.      Vascular Surgery History and Physical          Patient:  Binu Sunshine   Date of birth 1995, Medical record number [id number]  Date of Visit:  10/12/2022  Consult Requester:No att. 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  I have recommended takedown of the soleal sling via a medial approach below the knee as an outpatient.  We discussed there could still be some ongoing issues with her muscle and that physical therapy would be essential going forward.  We will plan to begin this 10 to 14 days after the procedure.  We also discussed that there may be nerve damage that could result in long-lasting challenges for her.  She is anxious to proceed and we will plan in the near future.    Many thanks for involving me in the care of this very pleasant patient. Should any questions or concerns arise, please don't hesitate to contact me.    Warm Regards,    Flavia Diallo MD, DFSVS, RPVI  Director, [facility] Vascular Services  Professor and Chief, Vascular and Endovascular Surgery  [facility]  Pager: 1. Send message or 10 digit call back number Securely via [email or web address] with the [employer] Web Console (learn more here)              2. Outside of [facility]? Call [phone]        45 minutes spent on the date of the encounter doing chart review, review of outside records, review of test results, interpretation of tests, patient visit, documentation and discussion with other provider(s)            HPI: Binu returns today for follow-up.  She had an EMG and saw neurosurgery who is in agreement regarding soleal band syndrome and compression of her tibial nerve and the likely associated compression on her distal popliteal artery.        From my September note:  Binu is a very pleasant 26-year-old female seen on video virtual visit today for left leg weakness.  This all started when she developed COVID and approximately 4 months later she began noticing significant left lower extremity weakness.  She notes her toes will go numb when she tries to do any type of walking.  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  She denies any specific trauma to the lower extremities.  She continues to have ongoing claudication and underwent a duplex of the left lower extremity showing a quadrupling of the velocities below the knee with plantar foot flexion.           Review of Systems:   CONSTITUTIONAL:  No fevers or chills  EYES: negative for icterus  ENT:  negative for hearing loss, tinnitus and sore throat  RESPIRATORY:  negative for cough with sputum and dyspnea  CARDIOVASCULAR:  negative for chest pain, dyspnea  GASTROINTESTINAL:  negative for nausea, vomiting, diarrhea and constipation  GENITOURINARY:  negative for dysuria  HEME:  No easy bruising  INTEGUMENT:  negative for rash and pruritus  NEURO:  Negative for headache           Past Medical History:   No past medical history on file.         Past Surgical History:   No past surgical history on file.         Family History:   No family history on file.         Social History:     Social History     Tobacco Use     Smoking status: Never     Smokeless tobacco: Never   Substance Use Topics     Alcohol use: Not on file     History   Sexual Activity     Sexual activity: Not on file            Current Medications (antimicrobials listed in bold):            Allergies:   No Known Allergies         Physical Exam:    Vitals were reviewed  No data found.  Ranges for his vital signs:       Physical Examination:  GENERAL:  well-developed, well-nourished, in bed in no acute distress.   HEENT:  Head is normocephalic, atraumatic   EYES:  Eyes have anicteric sclerae without conjunctival injection or stigmata of endocarditis.    ENT:  Oropharynx is moist without exudates or ulcers. Tongue is midline  NECK:  Supple. No  Cervical lymphadenopathy  LUNGS:  Clear to auscultation bilateral.   CARDIOVASCULAR:  Regular rate and rhythm with no murmurs, gallops or rubs.  ABDOMEN:  Normal bowel sounds, soft, nontender. No appreciable pulsatile masses.  SKIN:  No acute rashes.  No stigmata of endocarditis.  NEUROLOGIC:  Grossly nonfocal. Active x4 extremities  PULSES: Deferred for virtual visit today.         Laboratory Data:     Hematology Studies    No lab results found.    Metabolic Studies   No lab results found.    Imagin/22 Arterial Duplex study:    Velocity in the left below knee popliteal artery more than quadrupled  from neutral with plantar flexion suggesting compression and popliteal  entrapment.          Binu is a 27 year old who is being evaluated via a billable video visit.      How would you like to obtain your AVS? MyChart  If the video visit is dropped, the invitation should be resent by: Text to cell phone: [phone]        Will anyone else be joining your video visit? No   Patient states is currently in the [facility]: Yes    Joselyn Taylor Facilitator/[place], 10/12/2022         Video-Visit Details    Video Start Time: 8:30 AM    Type of service:  Video Visit    Video End Time:9 AM    Originating Location (pt. Location): Home    Distant Location (provider location):  [facility] VASCULAR CLINIC [place]     Platform used for Video Visit: [employer]        Again, thank you for allowing me to participate in the care of your patient.      Sincerely,    Flavia Diallo MD

## 2022-10-12 NOTE — TELEPHONE ENCOUNTER
Spoke with patient to schedule procedure with Dr. Flavia Diallo    Procedure was scheduled on 11/04 at [facility] OR  Patient will have H&P with MD will complete DOS per case req     Patient is aware a COVID-19 test is needed before their procedure. The test should be with-in 4 days of their procedure.   Test Details: Patient will schedule a home test    PO Visit scheduled on:     4-6 week   Dec 07, 2022  2:00 PM  (Arrive by 1:45 PM)  Video Visit with Flavia Diallo MD  [facility] [place] ([facility] ) [phone]        Patient is aware a / is needed day of surgery.   Surgery letter was sent via [employer], patient has my direct contact information for any further questions.     Vendor requested: no

## 2022-10-12 NOTE — NURSING NOTE
Chief Complaint   Patient presents with     Follow Up       Patient confirms medications and allergies are accurate via patients echeck in completion, and or denies any changes since last reviewed/verified.     Hannah Dowling, Virtual Facilitator

## 2022-11-02 ENCOUNTER — ANESTHESIA EVENT (OUTPATIENT)
Dept: SURGERY | Facility: CLINIC | Age: 27
End: 2022-11-02
Payer: COMMERCIAL

## 2022-11-02 RX ORDER — SWAB
1 SWAB, NON-MEDICATED MISCELLANEOUS DAILY
Status: ON HOLD | COMMUNITY
End: 2022-11-04

## 2022-11-02 RX ORDER — RIBOFLAVIN (VITAMIN B2) 100 MG
100 TABLET ORAL 3 TIMES DAILY
Status: ON HOLD | COMMUNITY
End: 2022-11-04

## 2022-11-04 ENCOUNTER — HOSPITAL ENCOUNTER (OUTPATIENT)
Facility: CLINIC | Age: 27
Discharge: HOME | End: 2022-11-04
Attending: SURGERY | Admitting: SURGERY
Payer: COMMERCIAL

## 2022-11-04 ENCOUNTER — ANESTHESIA (OUTPATIENT)
Dept: SURGERY | Facility: CLINIC | Age: 27
End: 2022-11-04
Payer: COMMERCIAL

## 2022-11-04 VITALS
TEMPERATURE: 97.9 F | BODY MASS INDEX: 24.35 KG/M2 | SYSTOLIC BLOOD PRESSURE: 119 MMHG | DIASTOLIC BLOOD PRESSURE: 85 MMHG | OXYGEN SATURATION: 99 % | RESPIRATION RATE: 18 BRPM | WEIGHT: 142.64 LBS | HEART RATE: 99 BPM | HEIGHT: 64 IN

## 2022-11-04 DIAGNOSIS — G58.9 ENTRAPMENT NEUROPATHY: Primary | ICD-10-CM

## 2022-11-04 LAB
ABO/RH(D): NORMAL
ANTIBODY SCREEN: NEGATIVE
ERYTHROCYTE [DISTWIDTH] IN BLOOD BY AUTOMATED COUNT: 12.5 % (ref 10–15)
GLUCOSE BLDC GLUCOMTR-MCNC: 94 MG/DL (ref 70–99)
HCT VFR BLD AUTO: 36.6 % (ref 35–47)
HGB BLD-MCNC: 12.2 G/DL (ref 11.7–15.7)
MCH RBC QN AUTO: 29.8 PG (ref 26.5–33)
MCHC RBC AUTO-ENTMCNC: 33.3 G/DL (ref 31.5–36.5)
MCV RBC AUTO: 90 FL (ref 78–100)
PLATELET # BLD AUTO: 210 10E3/UL (ref 150–450)
RBC # BLD AUTO: 4.09 10E6/UL (ref 3.8–5.2)
SPECIMEN EXPIRATION DATE: NORMAL
WBC # BLD AUTO: 6.5 10E3/UL (ref 4–11)

## 2022-11-04 PROCEDURE — 250N000011 HC RX IP 250 OP 636: Performed by: SURGERY

## 2022-11-04 PROCEDURE — 370N000017 HC ANESTHESIA TECHNICAL FEE, PER MIN: Performed by: SURGERY

## 2022-11-04 PROCEDURE — 250N000025 HC SEVOFLURANE, PER MIN: Performed by: SURGERY

## 2022-11-04 PROCEDURE — 250N000013 HC RX MED GY IP 250 OP 250 PS 637: Performed by: ANESTHESIOLOGY

## 2022-11-04 PROCEDURE — 250N000009 HC RX 250

## 2022-11-04 PROCEDURE — 710N000010 HC RECOVERY PHASE 1, LEVEL 2, PER MIN: Performed by: SURGERY

## 2022-11-04 PROCEDURE — 85027 COMPLETE CBC AUTOMATED: CPT | Performed by: SURGERY

## 2022-11-04 PROCEDURE — 272N000001 HC OR GENERAL SUPPLY STERILE: Performed by: SURGERY

## 2022-11-04 PROCEDURE — 710N000012 HC RECOVERY PHASE 2, PER MINUTE: Performed by: SURGERY

## 2022-11-04 PROCEDURE — 86901 BLOOD TYPING SEROLOGIC RH(D): CPT | Performed by: ANESTHESIOLOGY

## 2022-11-04 PROCEDURE — 250N000011 HC RX IP 250 OP 636: Performed by: ANESTHESIOLOGY

## 2022-11-04 PROCEDURE — 64708 REVISE ARM/LEG NERVE: CPT | Mod: LT | Performed by: SURGERY

## 2022-11-04 PROCEDURE — 250N000011 HC RX IP 250 OP 636

## 2022-11-04 PROCEDURE — 82962 GLUCOSE BLOOD TEST: CPT

## 2022-11-04 PROCEDURE — 86850 RBC ANTIBODY SCREEN: CPT | Performed by: ANESTHESIOLOGY

## 2022-11-04 PROCEDURE — 999N000141 HC STATISTIC PRE-PROCEDURE NURSING ASSESSMENT: Performed by: SURGERY

## 2022-11-04 PROCEDURE — 258N000003 HC RX IP 258 OP 636

## 2022-11-04 PROCEDURE — 360N000077 HC SURGERY LEVEL 4, PER MIN: Performed by: SURGERY

## 2022-11-04 PROCEDURE — 36415 COLL VENOUS BLD VENIPUNCTURE: CPT | Performed by: SURGERY

## 2022-11-04 PROCEDURE — 250N000009 HC RX 250: Performed by: SURGERY

## 2022-11-04 RX ORDER — DEXAMETHASONE SODIUM PHOSPHATE 4 MG/ML
INJECTION, SOLUTION INTRA-ARTICULAR; INTRALESIONAL; INTRAMUSCULAR; INTRAVENOUS; SOFT TISSUE PRN
Status: DISCONTINUED | OUTPATIENT
Start: 2022-11-04 | End: 2022-11-04

## 2022-11-04 RX ORDER — OXYCODONE HYDROCHLORIDE 5 MG/1
5 TABLET ORAL EVERY 4 HOURS PRN
Status: DISCONTINUED | OUTPATIENT
Start: 2022-11-04 | End: 2022-11-04 | Stop reason: HOSPADM

## 2022-11-04 RX ORDER — SODIUM CHLORIDE, SODIUM LACTATE, POTASSIUM CHLORIDE, CALCIUM CHLORIDE 600; 310; 30; 20 MG/100ML; MG/100ML; MG/100ML; MG/100ML
INJECTION, SOLUTION INTRAVENOUS CONTINUOUS
Status: DISCONTINUED | OUTPATIENT
Start: 2022-11-04 | End: 2022-11-04 | Stop reason: HOSPADM

## 2022-11-04 RX ORDER — FENTANYL CITRATE 50 UG/ML
INJECTION, SOLUTION INTRAMUSCULAR; INTRAVENOUS PRN
Status: DISCONTINUED | OUTPATIENT
Start: 2022-11-04 | End: 2022-11-04

## 2022-11-04 RX ORDER — ACETAMINOPHEN 325 MG/1
650 TABLET ORAL
Status: DISCONTINUED | OUTPATIENT
Start: 2022-11-04 | End: 2022-11-04 | Stop reason: HOSPADM

## 2022-11-04 RX ORDER — OXYCODONE HYDROCHLORIDE 5 MG/1
5-10 TABLET ORAL EVERY 4 HOURS PRN
Qty: 12 TABLET | Refills: 0 | Status: SHIPPED | OUTPATIENT
Start: 2022-11-04 | End: 2022-12-07

## 2022-11-04 RX ORDER — ONDANSETRON 4 MG/1
4 TABLET, ORALLY DISINTEGRATING ORAL EVERY 30 MIN PRN
Status: DISCONTINUED | OUTPATIENT
Start: 2022-11-04 | End: 2022-11-04 | Stop reason: HOSPADM

## 2022-11-04 RX ORDER — AMOXICILLIN 250 MG
1-2 CAPSULE ORAL 2 TIMES DAILY
Qty: 30 TABLET | Refills: 0 | Status: SHIPPED | OUTPATIENT
Start: 2022-11-04 | End: 2022-12-07

## 2022-11-04 RX ORDER — SODIUM CHLORIDE, SODIUM LACTATE, POTASSIUM CHLORIDE, CALCIUM CHLORIDE 600; 310; 30; 20 MG/100ML; MG/100ML; MG/100ML; MG/100ML
INJECTION, SOLUTION INTRAVENOUS CONTINUOUS PRN
Status: DISCONTINUED | OUTPATIENT
Start: 2022-11-04 | End: 2022-11-04

## 2022-11-04 RX ORDER — CEFAZOLIN SODIUM/WATER 2 G/20 ML
2 SYRINGE (ML) INTRAVENOUS
Status: COMPLETED | OUTPATIENT
Start: 2022-11-04 | End: 2022-11-04

## 2022-11-04 RX ORDER — OXYCODONE HYDROCHLORIDE 5 MG/1
5 TABLET ORAL
Status: COMPLETED | OUTPATIENT
Start: 2022-11-04 | End: 2022-11-04

## 2022-11-04 RX ORDER — OXYCODONE HYDROCHLORIDE 5 MG/1
5 TABLET ORAL
Status: DISCONTINUED | OUTPATIENT
Start: 2022-11-04 | End: 2022-11-04 | Stop reason: HOSPADM

## 2022-11-04 RX ORDER — IBUPROFEN 600 MG/1
600 TABLET, FILM COATED ORAL EVERY 8 HOURS
Qty: 30 TABLET | Refills: 0 | Status: SHIPPED | OUTPATIENT
Start: 2022-11-07 | End: 2022-11-17

## 2022-11-04 RX ORDER — ACETAMINOPHEN 325 MG/1
650 TABLET ORAL EVERY 4 HOURS PRN
Qty: 50 TABLET | Refills: 0 | Status: SHIPPED | OUTPATIENT
Start: 2022-11-04 | End: 2022-12-07

## 2022-11-04 RX ORDER — PROPOFOL 10 MG/ML
INJECTION, EMULSION INTRAVENOUS PRN
Status: DISCONTINUED | OUTPATIENT
Start: 2022-11-04 | End: 2022-11-04

## 2022-11-04 RX ORDER — BUPIVACAINE HYDROCHLORIDE AND EPINEPHRINE 2.5; 5 MG/ML; UG/ML
INJECTION, SOLUTION EPIDURAL; INFILTRATION; INTRACAUDAL; PERINEURAL PRN
Status: DISCONTINUED | OUTPATIENT
Start: 2022-11-04 | End: 2022-11-04 | Stop reason: HOSPADM

## 2022-11-04 RX ORDER — HYDRALAZINE HYDROCHLORIDE 20 MG/ML
2.5-5 INJECTION INTRAMUSCULAR; INTRAVENOUS EVERY 10 MIN PRN
Status: DISCONTINUED | OUTPATIENT
Start: 2022-11-04 | End: 2022-11-04 | Stop reason: HOSPADM

## 2022-11-04 RX ORDER — FENTANYL CITRATE 50 UG/ML
25-50 INJECTION, SOLUTION INTRAMUSCULAR; INTRAVENOUS
Status: DISCONTINUED | OUTPATIENT
Start: 2022-11-04 | End: 2022-11-04 | Stop reason: HOSPADM

## 2022-11-04 RX ORDER — FENTANYL CITRATE 50 UG/ML
25-50 INJECTION, SOLUTION INTRAMUSCULAR; INTRAVENOUS EVERY 5 MIN PRN
Status: DISCONTINUED | OUTPATIENT
Start: 2022-11-04 | End: 2022-11-04 | Stop reason: HOSPADM

## 2022-11-04 RX ORDER — ONDANSETRON 2 MG/ML
INJECTION INTRAMUSCULAR; INTRAVENOUS PRN
Status: DISCONTINUED | OUTPATIENT
Start: 2022-11-04 | End: 2022-11-04

## 2022-11-04 RX ORDER — ONDANSETRON 2 MG/ML
4 INJECTION INTRAMUSCULAR; INTRAVENOUS EVERY 30 MIN PRN
Status: DISCONTINUED | OUTPATIENT
Start: 2022-11-04 | End: 2022-11-04 | Stop reason: HOSPADM

## 2022-11-04 RX ORDER — KETOROLAC TROMETHAMINE 30 MG/ML
INJECTION, SOLUTION INTRAMUSCULAR; INTRAVENOUS PRN
Status: DISCONTINUED | OUTPATIENT
Start: 2022-11-04 | End: 2022-11-04

## 2022-11-04 RX ORDER — MEPERIDINE HYDROCHLORIDE 25 MG/ML
12.5 INJECTION INTRAMUSCULAR; INTRAVENOUS; SUBCUTANEOUS
Status: DISCONTINUED | OUTPATIENT
Start: 2022-11-04 | End: 2022-11-04 | Stop reason: HOSPADM

## 2022-11-04 RX ORDER — ACETAMINOPHEN 325 MG/1
975 TABLET ORAL ONCE
Status: COMPLETED | OUTPATIENT
Start: 2022-11-04 | End: 2022-11-04

## 2022-11-04 RX ORDER — LIDOCAINE HYDROCHLORIDE 20 MG/ML
INJECTION, SOLUTION INFILTRATION; PERINEURAL PRN
Status: DISCONTINUED | OUTPATIENT
Start: 2022-11-04 | End: 2022-11-04

## 2022-11-04 RX ORDER — HYDROMORPHONE HCL IN WATER/PF 6 MG/30 ML
.2-.4 PATIENT CONTROLLED ANALGESIA SYRINGE INTRAVENOUS EVERY 10 MIN PRN
Status: DISCONTINUED | OUTPATIENT
Start: 2022-11-04 | End: 2022-11-04 | Stop reason: HOSPADM

## 2022-11-04 RX ORDER — CEFAZOLIN SODIUM/WATER 2 G/20 ML
2 SYRINGE (ML) INTRAVENOUS SEE ADMIN INSTRUCTIONS
Status: DISCONTINUED | OUTPATIENT
Start: 2022-11-04 | End: 2022-11-04 | Stop reason: HOSPADM

## 2022-11-04 RX ORDER — LIDOCAINE 40 MG/G
CREAM TOPICAL
Status: DISCONTINUED | OUTPATIENT
Start: 2022-11-04 | End: 2022-11-04 | Stop reason: HOSPADM

## 2022-11-04 RX ORDER — KETOROLAC TROMETHAMINE 10 MG/1
10 TABLET, FILM COATED ORAL EVERY 8 HOURS
Qty: 9 TABLET | Refills: 0 | Status: SHIPPED | OUTPATIENT
Start: 2022-11-04 | End: 2022-11-07

## 2022-11-04 RX ADMIN — ACETAMINOPHEN 975 MG: 325 TABLET, FILM COATED ORAL at 06:31

## 2022-11-04 RX ADMIN — ONDANSETRON 4 MG: 2 INJECTION INTRAMUSCULAR; INTRAVENOUS at 08:54

## 2022-11-04 RX ADMIN — FENTANYL CITRATE 100 MCG: 50 INJECTION, SOLUTION INTRAMUSCULAR; INTRAVENOUS at 07:42

## 2022-11-04 RX ADMIN — OXYCODONE HYDROCHLORIDE 5 MG: 5 TABLET ORAL at 11:22

## 2022-11-04 RX ADMIN — DEXAMETHASONE SODIUM PHOSPHATE 6 MG: 4 INJECTION, SOLUTION INTRA-ARTICULAR; INTRALESIONAL; INTRAMUSCULAR; INTRAVENOUS; SOFT TISSUE at 07:42

## 2022-11-04 RX ADMIN — Medication 2 G: at 07:41

## 2022-11-04 RX ADMIN — SODIUM CHLORIDE, POTASSIUM CHLORIDE, SODIUM LACTATE AND CALCIUM CHLORIDE: 600; 310; 30; 20 INJECTION, SOLUTION INTRAVENOUS at 07:34

## 2022-11-04 RX ADMIN — PHENYLEPHRINE HYDROCHLORIDE 100 MCG: 10 INJECTION INTRAVENOUS at 07:56

## 2022-11-04 RX ADMIN — PHENYLEPHRINE HYDROCHLORIDE 100 MCG: 10 INJECTION INTRAVENOUS at 07:47

## 2022-11-04 RX ADMIN — OXYCODONE HYDROCHLORIDE 5 MG: 5 TABLET ORAL at 07:23

## 2022-11-04 RX ADMIN — LIDOCAINE HYDROCHLORIDE 40 MG: 20 INJECTION, SOLUTION INFILTRATION; PERINEURAL at 07:31

## 2022-11-04 RX ADMIN — HYDROMORPHONE HYDROCHLORIDE 0.2 MG: 0.2 INJECTION, SOLUTION INTRAMUSCULAR; INTRAVENOUS; SUBCUTANEOUS at 10:33

## 2022-11-04 RX ADMIN — KETOROLAC TROMETHAMINE 30 MG: 30 INJECTION, SOLUTION INTRAMUSCULAR at 08:54

## 2022-11-04 RX ADMIN — FENTANYL CITRATE 25 MCG: 50 INJECTION INTRAMUSCULAR; INTRAVENOUS at 09:48

## 2022-11-04 RX ADMIN — PROPOFOL 200 MG: 10 INJECTION, EMULSION INTRAVENOUS at 07:42

## 2022-11-04 RX ADMIN — FENTANYL CITRATE 25 MCG: 50 INJECTION INTRAMUSCULAR; INTRAVENOUS at 09:57

## 2022-11-04 RX ADMIN — FENTANYL CITRATE 25 MCG: 50 INJECTION INTRAMUSCULAR; INTRAVENOUS at 10:21

## 2022-11-04 RX ADMIN — HYDROMORPHONE HYDROCHLORIDE 0.2 MG: 0.2 INJECTION, SOLUTION INTRAMUSCULAR; INTRAVENOUS; SUBCUTANEOUS at 10:58

## 2022-11-04 RX ADMIN — MIDAZOLAM 2 MG: 1 INJECTION INTRAMUSCULAR; INTRAVENOUS at 07:34

## 2022-11-04 RX ADMIN — FENTANYL CITRATE 25 MCG: 50 INJECTION INTRAMUSCULAR; INTRAVENOUS at 10:08

## 2022-11-04 ASSESSMENT — ACTIVITIES OF DAILY LIVING (ADL)
ADLS_ACUITY_SCORE: 36

## 2022-11-04 NOTE — INTERVAL H&P NOTE
I have reviewed the surgical (or preoperative) H&P that is linked to this encounter, and examined the patient. There are no significant changes    Clinical Conditions Present on Arrival:  Clinically Significant Risk Factors Present on Admission                       Please page if questions,    Delano Tafoya MD, MS  PGY-2, General Surgery

## 2022-11-04 NOTE — BRIEF OP NOTE
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  Complications: None.  Implants: * No implants in log *      Rossana Clemons MD  11/04/22  9:34 AM

## 2022-11-04 NOTE — ANESTHESIA PROCEDURE NOTES
Airway       Patient location during procedure: OR       Procedure Start/Stop Times: 11/4/2022 7:58 AM and 11/4/2022 7:50 AM  Staff -        Anesthesiologist:  Maycol Rick MD       CRNA: Wilder Araiza APRN CRNA       Performed By: CRNA  Consent for Airway        Urgency: elective  Indications and Patient Condition       Indications for airway management: gissel-procedural       Induction type:intravenous       Mask difficulty assessment: 0 - not attempted    Final Airway Details       Final airway type: supraglottic airway    Supraglottic Airway Details        Type: LMA       Brand: I-Gel       LMA size: 4    Post intubation assessment        Placement verified by: capnometry, equal breath sounds and chest rise        Number of attempts at approach: 1       Number of other approaches attempted: 0       Secured with: silk tape       Ease of procedure: easy       Dentition: Intact    Medication(s) Administered   Medication Administration Time: 11/4/2022 7:58 AM

## 2022-11-04 NOTE — OP NOTE
Date: November 4, 2022    Pre-operative Diagnosis: Left soleal sling syndrome     Post-operative Diagnosis: Same     Procedure(s): Left soleus tendon release and tibial nerve neurolyis     Surgeon: Flavia Diallo MD     Assistant: Rossana Clemons MD and Delano Tafoya MD     Anesthesia: General     Indications: This 27 year old female had left lower extremity claudication in a neurogenic and arterial fashion due to soleal sling syndrome.  I recommended release of the soleus muscle tendon and sling.  She understood the risks and benefits and wished to proceed.      Findings: Aberrant medial positioning of the tibial nerve compressed against the soleal sling     Complications: None     Estimated Blood Loss:  5cc     Drains: None     Specimens: None        Procedure Details:      The patient was brought to the operating placed in supine position.  After general anesthesia was achieved and timeout performed the left lower extremity was prepped and draped in sterile fashion.  Through a medial incision the below-knee popliteal space was encountered.  The tibial nerve was positioned very medial and tracked tightly through the soleal sling.  The soleus muscle was then taken down on the inferior aspect of the tibia with release where it was compressing the neurovascular bundle.  A portion of the medial aspect of the soleus muscle in the area adjacent to the soleal sling was then resected.  Neurolysis was then undertaken of the tibial nerve throughout the below-knee popliteal exposure as well as the level of the vascular trifurcation.  The popliteal artery was also dissected free from the surrounding structures.  Once hemostasis was achieved,  the incision was then closed in layers with running 3.0 Vicryl and 4.0 monocril followed by glued Tegaderm. The patient appeared to have tolerated the procedure well without immediate complication. Sponge, needle and instrument count was reported as correct at the end of the case. I was  present throughout the entire portion of the procedure.              Flavia Diallo MD, DFSVS, RPVI  Director, [facility]  Chief, Vascular and Endovascular Surgery  [facility]  Pager: 1. Send message or 10 digit call back number Securely via EatWith with the Vocera Web Console (learn more here)              2. Outside of [facility]? Call [phone]

## 2022-11-04 NOTE — ANESTHESIA PREPROCEDURE EVALUATION
Anesthesia Pre-Procedure Evaluation    Patient: Binu Sunshine   MRN: [id number] : 1995        Procedure : Procedure(s):  Left soleus muscle release for soleal sling syndrome          History reviewed. No pertinent past medical history.   History reviewed. No pertinent surgical history.   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      METS/Exercise Tolerance:     Hematologic:       Musculoskeletal:       GI/Hepatic:  - neg GI/hepatic ROS     Renal/Genitourinary:  - neg Renal ROS     Endo:  - neg endo ROS     Psychiatric/Substance Use:       Infectious Disease:       Malignancy:       Other:            Physical Exam    Airway        Mallampati: II   TM distance: > 3 FB   Neck ROM: full   Mouth opening: > 3 cm    Respiratory Devices and Support         Dental  no notable dental history         Cardiovascular          Rhythm and rate: regular and normal     Pulmonary           breath sounds clear to auscultation           OUTSIDE LABS:  CBC:   Lab Results   Component Value Date    WBC 6.5 2022    HGB 12.2 2022    HCT 36.6 2022     2022     BMP:   Lab Results   Component Value Date    GLC 94 2022     COAGS: No results found for: PTT, INR, FIBR  POC: No results found for: BGM, HCG, HCGS  HEPATIC: No results found for: ALBUMIN, PROTTOTAL, ALT, AST, GGT, ALKPHOS, BILITOTAL, BILIDIRECT, LIANG  OTHER: No results found for: PH, LACT, A1C, ROSA MARIA, PHOS, MAG, LIPASE, AMYLASE, TSH, T4, T3, CRP, SED    Anesthesia Plan    ASA Status:  2    NPO Status:  NPO Appropriate    Anesthesia Type: General.     - Airway: LMA              Consents    Anesthesia Plan(s) and associated risks, benefits, and realistic alternatives discussed. Questions answered and patient/representative(s) expressed understanding.    - Discussed:     - Discussed with:  Patient      - Extended Intubation/Ventilatory Support Discussed: No.      - Patient is DNR/DNI Status: No    Use of blood products discussed: Yes.     - Discussed with: Patient.     - Consented: consented to blood products            Reason for refusal: other.     Postoperative Care            Comments:                Maycol Rick MD

## 2022-11-04 NOTE — DISCHARGE INSTRUCTIONS
[facility], [place]  Same-Day Surgery   Adult Discharge Orders & Instructions     For 24 hours after surgery    Get plenty of rest.  A responsible adult must stay with you for at least 24 hours after you leave the hospital.   Do not drive or use heavy equipment.  If you have weakness or tingling, don't drive or use heavy equipment until this feeling goes away.  Do not drink alcohol.  Avoid strenuous or risky activities.  Ask for help when climbing stairs.   You may feel lightheaded.  IF so, sit for a few minutes before standing.  Have someone help you get up.   If you have nausea (feel sick to your stomach): Drink only clear liquids such as apple juice, ginger ale, broth or 7-Up.  Rest may also help.  Be sure to drink enough fluids.  Move to a regular diet as you feel able.  You may have a slight fever. Call the doctor if your fever is over 100 F (37.7 C) (taken under the tongue) or lasts longer than 24 hours.  You may have a dry mouth, a sore throat, muscle aches or trouble sleeping.  These should go away after 24 hours.  Do not make important or legal decisions.   Call your doctor for any of the followin.  Signs of infection (fever, growing tenderness at the surgery site, a large amount of drainage or bleeding, severe pain, foul-smelling drainage, redness, swelling).    2. It has been over 8 to 10 hours since surgery and you are still not able to urinate (pass water).    3.  Headache for over 24 hours.    To contact a doctor, [facility] at [phone] and ask for the resident on call for Vascular Surgery (answered 24 hours a day)    Emergency Department on [facility]: [phone]                                                                  (TTY for hearing impaired: [phone])

## 2022-11-04 NOTE — ANESTHESIA POSTPROCEDURE EVALUATION
Patient: Binu Sunsihne    Procedure: Procedure(s):  Left soleus muscle release for soleal sling syndrome       Anesthesia Type:  General    Note:  Disposition: Outpatient   Postop Pain Control: Uneventful            Sign Out: Well controlled pain   PONV: No   Neuro/Psych: Uneventful            Sign Out: Acceptable/Baseline neuro status   Airway/Respiratory: Uneventful            Sign Out: Acceptable/Baseline resp. status   CV/Hemodynamics: Uneventful            Sign Out: Acceptable CV status; No obvious hypovolemia; No obvious fluid overload   Other NRE: NONE   DID A NON-ROUTINE EVENT OCCUR? No           Last vitals:  Vitals Value Taken Time   /82 11/04/22 1000   Temp     Pulse 85 11/04/22 1002   Resp 9 11/04/22 1002   SpO2 100 % 11/04/22 1002   Vitals shown include unvalidated device data.    Electronically Signed By: Дмитрий Gant MD  November 4, 2022  10:03 AM

## 2022-11-04 NOTE — ANESTHESIA CARE TRANSFER NOTE
Patient: Binu Sunshine    Procedure: Procedure(s):  Left soleus muscle release for soleal sling syndrome       Diagnosis: Injury of left tibial nerve, subsequent encounter [S84.02XD]  Popliteal artery entrapment syndrome (H) [I77.89]  Diagnosis Additional Information: No value filed.    Anesthesia Type:   General     Note:    Oropharynx: oropharynx clear of all foreign objects and spontaneously breathing  Level of Consciousness: awake  Oxygen Supplementation: nasal cannula  Level of Supplemental Oxygen (L/min / FiO2): 2  Independent Airway: airway patency satisfactory and stable  Dentition: dentition unchanged  Vital Signs Stable: post-procedure vital signs reviewed and stable  Report to RN Given: handoff report given  Patient transferred to: PACU    Handoff Report: Identifed the Patient, Identified the Reponsible Provider, Reviewed the pertinent medical history, Discussed the surgical course, Reviewed Intra-OP anesthesia mangement and issues during anesthesia, Set expectations for post-procedure period and Allowed opportunity for questions and acknowledgement of understanding      Vitals:  Vitals Value Taken Time   /81 11/04/22 0937   Temp     Pulse 92 11/04/22 0940   Resp 15 11/04/22 0940   SpO2 100 % 11/04/22 0940   Vitals shown include unvalidated device data.    Electronically Signed By: SERENA Anguiano CRNA  November 4, 2022  9:41 AM

## 2022-11-06 DIAGNOSIS — S84.02XD INJURY OF LEFT TIBIAL NERVE, SUBSEQUENT ENCOUNTER: Primary | ICD-10-CM

## 2022-11-07 ENCOUNTER — TELEPHONE (OUTPATIENT)
Dept: VASCULAR SURGERY | Facility: CLINIC | Age: 27
End: 2022-11-07

## 2022-11-07 NOTE — TELEPHONE ENCOUNTER
Spoke with Binu regarding how pt is doing s/p L soleus sling release.    Pt reports their pain is improving each day and they are using Tylenol, oxycodone, ibuprofen, and Voltaren gel to control their pain. She feels her pain regimen is adequate at this time. She reports a burning sensation around her incision when her feet are dependent. She is comfortable when laying down w/ legs elevated, but pain is significant when attempting to walk/stand. She has not done much ambulation yet dt pain, but feels that crutches will help with this. We will send the script for crutches today and she will pick these up later this afternoon. The incision is WNL w/ expected bruising. We discussed signs of infection to monitor for. Tegaderm is in place and we discussed leaving this in place for 2 weeks. Pt reports they are eating and drinking w/o difficulty. Pt is voiding w/o difficulty and has had a bowel movement. She is taking senna-docusate which has been helpful.     VQI measures: N/A    Confirmed follow up appointments and provided callback number for further questions/concerns.    YENY Banda, RN  [facility] - [facility]  Ph: [phone]  Fax: [phone]

## 2022-11-11 ENCOUNTER — MYC MEDICAL ADVICE (OUTPATIENT)
Dept: VASCULAR SURGERY | Facility: CLINIC | Age: 27
End: 2022-11-11

## 2022-11-11 DIAGNOSIS — M79.605 LEFT LEG PAIN: Primary | ICD-10-CM

## 2022-11-16 ENCOUNTER — ANCILLARY PROCEDURE (OUTPATIENT)
Dept: ULTRASOUND IMAGING | Facility: CLINIC | Age: 27
End: 2022-11-16
Attending: SURGERY
Payer: COMMERCIAL

## 2022-11-16 DIAGNOSIS — M79.605 LEFT LEG PAIN: ICD-10-CM

## 2022-11-16 PROCEDURE — 93971 EXTREMITY STUDY: CPT | Mod: LT | Performed by: RADIOLOGY

## 2022-11-22 ENCOUNTER — THERAPY VISIT (OUTPATIENT)
Dept: PHYSICAL THERAPY | Facility: CLINIC | Age: 27
End: 2022-11-22
Payer: COMMERCIAL

## 2022-11-22 DIAGNOSIS — I77.89 POPLITEAL ARTERY ENTRAPMENT SYNDROME (H): ICD-10-CM

## 2022-11-22 DIAGNOSIS — S84.02XD INJURY OF LEFT TIBIAL NERVE, SUBSEQUENT ENCOUNTER: ICD-10-CM

## 2022-11-22 PROCEDURE — 97110 THERAPEUTIC EXERCISES: CPT | Mod: GP

## 2022-11-22 PROCEDURE — 97161 PT EVAL LOW COMPLEX 20 MIN: CPT | Mod: GP

## 2022-11-22 NOTE — PROGRESS NOTES
Physical Therapy Initial Evaluation  Subjective:    Patient Health History  Binu Sunshine being seen for After surgery pt.       Problem occurred: Unsure   Pain is reported as 4/10 and 5/10 on pain scale.  General health as reported by patient is good.  Pertinent medical history includes: none.     Medical allergies: none.   Surgeries include:  Other. Other surgery history details: Soleal sling release and tibial nerve decompression.    Current medications:  Anti-inflammatory.       Primary job tasks include:  Computer work, prolonged sitting and repetitive tasks.                  Therapist Generated HPI Evaluation  Problem details: Binu is s/p popliteal entrapment surg on 11/4/22 w/ L soleus tendon release and tib n neurolysis. She reports LE swelling postop, catie when her foot is in the dependent position. Has been elevating often. Is currently PWB on crutches and feels lots of pulling sensation when shifting weight forward on the L foot.  Since surgery feeling sensitive & burning sensation over the medial calf into the plantar foot. Has hot-cold discrimination.   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 Does have elevator in her office. Lives in [address] home but staying on one level so not navigating stairs often. Says was told by surgeon to weight bear slowly but no ROM or WB restrictions.     Goals: Walking without pain, learn how to strengthen my calf. Eventually would like to return to gym and running.         Type of problem:  Left knee.    This is a new condition.      Patient reports pain:  Medial.  Pain is described as burning and sharp and is intermittent.  Pain radiates to:  Lower leg and ankle.   Since onset symptoms are gradually improving.  Associated symptoms:  Edema, loss of motion/stiffness, loss of strength and numbness. Symptoms are exacerbated by bending/squatting, standing, walking and weight bearing  and  relieved by heat, ice and bracing/immobilizing.      Restrictions due to condition include:  Currently not working due to present treatment.  Barriers include:  None as reported by patient.                        Objective:    Gait:    Weight Bearing Status:  WBAT   Assistive Devices:  Crutches  Deviations:  Knee:  Knee flexion decr L and knee extension decr LAnkle:  Push off decr L          Ankle/Foot Evaluation  ROM:    AROM:    Dorsiflexion:  Left:   Lacking 5*  Right:   0  Plantarflexion:  Left:  40    Right:  40  Inversion:  Left:  23     Right:  25  Eversion:  9     Right:  18      PROM:                Pain: *=pain    Strength:                Anterior Tibialis:Left: 3+/5  Weak/pain free  Pain:  Right: 4+/5  Pain:  Posterior Tibialis: Left:  3+/5  Weak/painful  Pain:++  Right: 4+/5  Pain:  Peroneals: Left: 3+/5  Weak/pain free  Pain:  Right: 4+/5  Pain:            PALPATION: Palpation of ankle: Intact sensation in LLE.  Left ankle tenderness present at:  gastroc/soleus and incisional  Left ankle tenderness not present at:   achilles tendon; anterior tibialis; posterior tibialis; plantar fascia or peroneals    Right ankle tenderness not present at:  posterior tibialis  EDEMA: Edema ankle: non-pitting mild edema in L calf and foot. Well-healing incision, no redness or warmth. Mild discomfort w/ touch.  Left ankle edema present at: general                                                        Knee Evaluation:  ROM:  AROM: normal            Strength:         Quad Set Left: Poor    Pain:   Quad Set Right: Good    Pain:                  General     ROS    Assessment/Plan:    Patient is a 27 year old female with left side knee and left side ankle complaints.    Patient has the following significant findings with corresponding treatment plan.                Diagnosis 1:  L ankle impairment s/p  L soleus tendon release and tib n neurolysis   Pain -  hot/cold therapy, US, electric stimulation, mechanical traction,  manual therapy, STS, splint/taping/bracing/orthotics, self management, education, directional preference exercise and home program  Decreased ROM/flexibility - manual therapy, therapeutic exercise and therapeutic activity  Decreased strength - therapeutic exercise, therapeutic activities and home program  Edema - vasopneumatics, electric stimulation, cold therapy, cryocuff and self management/home program  Impaired gait - gait training, assistive devices and home program  Impaired muscle performance - biofeedback, electric stimulation, neuro re-education and home program  Decreased function - therapeutic activities, home program and functional performance testing    Therapy Evaluation Codes:   1) History comprised of:   Personal factors that impact the plan of care:      None.    Comorbidity factors that impact the plan of care are:      None.     Medications impacting care: None.  2) Examination of Body Systems comprised of:   Body structures and functions that impact the plan of care:      Ankle, Hip, Knee, Pelvis and Toes.   Activity limitations that impact the plan of care are:      Bathing, Driving, Dressing, Jumping, Lifting, Running, Sitting, Sports, Squatting/kneeling, Stairs, Standing, Walking and Working.  3) Clinical presentation characteristics are:   Evolving/Changing.  4) Decision-Making    Low complexity using standardized patient assessment instrument and/or measureable assessment of functional outcome.  Cumulative Therapy Evaluation is: Low complexity.    Previous and current functional limitations:  (See Goal Flow Sheet for this information)    Short term and Long term goals: (See Goal Flow Sheet for this information)     Communication ability:  Patient appears to be able to clearly communicate and understand verbal and written communication and follow directions correctly.  Treatment Explanation - The following has been discussed with the patient:   RX ordered/plan of care  Anticipated  outcomes  Possible risks and side effects  This patient would benefit from PT intervention to resume normal activities.   Rehab potential is good.    Frequency:  1 X week, once daily  Duration:  for 6 weeks tapering to 2 X a month over 12 weeks  Discharge Plan:  Achieve all LTG.  Independent in home treatment program.  Reach maximal therapeutic benefit.    Please refer to the daily flowsheet for treatment today, total treatment time and time spent performing 1:1 timed codes.     Angeline Rios, PT, DPT

## 2022-11-22 NOTE — PROGRESS NOTES
[facility]    OUTPATIENT Physical Therapy ORTHOPEDIC EVALUATION  PLAN OF TREATMENT FOR OUTPATIENT REHABILITATION  (COMPLETE FOR INITIAL CLAIMS ONLY)  Patient's Last Name, First Name, M.I.  YOB: 1995  Binu Sunshine    Provider s Name:  [facility]   Medical Record No.  [id number]   Start of Care Date:  11/22/22   Onset Date:  11/04/22    Treatment Diagnosis:  L ankle impairment s/p L soleus tendon release and tib n neurolysis Medical Diagnosis:     Popliteal artery entrapment syndrome (H)  Injury of left tibial nerve, subsequent encounter       Goals:     11/22/22 0500   Body Part   Goals listed below are for L LE Postop   Goal #1   Goal #1 ambulation   Current Functional Level Minutes patient can walk;with 2 crutches   Performance Level 5   STG Target Performance Minutes patient will be able to walk   Performance Level 10 min w/ least restrictive AD and pain <4/10   Rationale for safe household ambulation;for safe outdoor household ambulation;for safe community ambulation;for safe work place ambulation;to promote a healthy and active lifestyle;to maintain proper body mechanics/posture while ambulating to avoid additional compensatory injury due to improper gait mechanics   Due Date 12/20/22    LTG Target Performance Minutes patient will be able to  walk   Performance Level 30 min w/ least restrictive AD and pain <4/10   Rationale for safe household ambulation;for safe outdoor household ambulation;for safe community ambulation;for safe work place ambulation;to maintain proper body mechanics/posture while ambulating to avoid additional compensatory injury due to improper gait mechanics;to promote a healthy and active lifestyle   Due Date 02/14/23       Therapy Frequency:  1x/wk weaning to e-o wk  Predicted Duration of Therapy Intervention:  3 mo    TIFF ALLAN  MATTHEW, PT                 I CERTIFY THE NEED FOR THESE SERVICES FURNISHED UNDER        THIS PLAN OF TREATMENT AND WHILE UNDER MY CARE     (Physician co-signature of this document indicates review and certification of the therapy plan).                     Certification Date From:  11/22/22   Certification Date To:  02/14/23    Referring Provider:  Flavia Diallo    Initial Assessment        See Epic Evaluation SOC Date: 11/22/22

## 2022-12-06 ENCOUNTER — TELEPHONE (OUTPATIENT)
Dept: PHYSICAL THERAPY | Facility: CLINIC | Age: 27
End: 2022-12-06

## 2022-12-06 NOTE — TELEPHONE ENCOUNTER
Hank Schmid,    I missed you for your PT appointment today at 2:00PM. Our next scheduled appointment will be on 12/20 @ 2 PM. Please give us a call if you need to cancel or reschedule this appointment. Thank you,    Angeline Rios, PT, DPT  [phone]

## 2022-12-07 ENCOUNTER — VIRTUAL VISIT (OUTPATIENT)
Dept: VASCULAR SURGERY | Facility: CLINIC | Age: 27
End: 2022-12-07
Payer: COMMERCIAL

## 2022-12-07 DIAGNOSIS — I77.89 POPLITEAL ARTERY ENTRAPMENT SYNDROME (H): Primary | ICD-10-CM

## 2022-12-07 PROCEDURE — 99024 POSTOP FOLLOW-UP VISIT: CPT | Performed by: SURGERY

## 2022-12-07 NOTE — NURSING NOTE
Chief Complaint   Patient presents with     Follow Up   Concern: States has contact dermatitis     Patient confirms medications and allergies are accurate via patients echeck in completion, and or denies any changes since last reviewed/verified.       Hannah Dowling, Virtual Facilitator

## 2022-12-07 NOTE — LETTER
12/7/2022       RE: Binu Sunshine  [address]  [place] [address]     Dear Colleague,    Thank you for referring your patient, Binu Sunshine, to the [facility] at [facility]. Please see a copy of my visit note below.    Vascular Surgery Clinic Post-Procedure Follow Up Visit    December 7, 2022    Binu Sunshine  [id number]      HPI: Patient follows up today s/p left soleal sling release for left popliteal artery entrapment and soleal sling syndrome on November 4.  After getting over the initial postoperative discomfort she is noting significant improvement in her function.  She has been able to feel the leg much more appropriately and notes some new sensations where previously the leg had felt deadened.  She is also improving her strength and feels like she is making good progress.  Swelling has improved significantly and using compression appropriately.  Denies chest pain, fever, chills.       Please see Epic rooming record from today documenting vital signs and current medications.    On exam, pleasant 27 year old in NAD.  Left below-knee incision is well-healed.      AP: Doing well s/p left soleal sling release for popliteal entrapment and soleal sling syndrome. Binu will continue to advance her activity and strengthen her left lower extremity.  I suspect she will be able to be rid of the 1 supportive crutch within the next several weeks.  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  I encouraged her to reach out to me if any additional questions or issues arise.    Many thanks for involving me in the care of this very pleasant patient. Should any questions or concerns arise, please don't hesitate to contact me.    Warm Regards,    Flavia Diallo MD, DFSVS, RPVI  Director, [facility]  Professor and Chief, Vascular and Endovascular Surgery  [facility]  [email or web address]  Pager:  Vocera edgar

## 2022-12-07 NOTE — PROGRESS NOTES
Vascular Surgery Clinic Post-Procedure Follow Up Visit    December 7, 2022    Binu Sunshine  [id number]      HPI: Patient follows up today s/p left soleal sling release for left popliteal artery entrapment and soleal sling syndrome on November 4.  After getting over the initial postoperative discomfort she is noting significant improvement in her function.  She has been able to feel the leg much more appropriately and notes some new sensations where previously the leg had felt deadened.  She is also improving her strength and feels like she is making good progress.  Swelling has improved significantly and using compression appropriately.  Denies chest pain, fever, chills.       Please see Epic rooming record from today documenting vital signs and current medications.    On exam, tony 27 year old in NAD.  Left below-knee incision is well-healed.      AP: Doing well s/p left soleal sling release for popliteal entrapment and soleal sling syndrome. Binu will continue to advance her activity and strengthen her left lower extremity.  I suspect she will be able to be rid of the 1 supportive crutch within the next several weeks.  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  I encouraged her to reach out to me if any additional questions or issues arise.    Many thanks for involving me in the care of this very pleasant patient. Should any questions or concerns arise, please don't hesitate to contact me.    Warm Regards,    Flavia Diallo MD, DFSVS, RPVI  Director, [facility]  Professor and Chief, Vascular and Endovascular Surgery  [facility]  [email or web address]  Pager: Ermelinda Schmid is a 27 year old who is being evaluated via a billable video visit.      How would you like to obtain your AVS? MyChart  If the video visit is dropped, the invitation should be resent by: Text to cell phone: [phone]  Will anyone else be joining your video visit? No   Patient states is currently  in the [facility]: Yes        Video-Visit Details    Video Start Time: 2 PM    Type of service:  Video Visit    Video End Time:2:30 PM    Originating Location (pt. Location): Home    Distant Location (provider location):  Off-site    Platform used for Video Visit: Pipo

## 2022-12-20 ENCOUNTER — THERAPY VISIT (OUTPATIENT)
Dept: PHYSICAL THERAPY | Facility: CLINIC | Age: 27
End: 2022-12-20
Payer: COMMERCIAL

## 2022-12-20 DIAGNOSIS — S84.02XD INJURY OF LEFT TIBIAL NERVE, SUBSEQUENT ENCOUNTER: ICD-10-CM

## 2022-12-20 DIAGNOSIS — I77.89 POPLITEAL ARTERY ENTRAPMENT SYNDROME (H): Primary | ICD-10-CM

## 2022-12-20 PROCEDURE — 97110 THERAPEUTIC EXERCISES: CPT | Mod: GP

## 2023-01-12 ENCOUNTER — THERAPY VISIT (OUTPATIENT)
Dept: PHYSICAL THERAPY | Facility: CLINIC | Age: 28
End: 2023-01-12
Payer: COMMERCIAL

## 2023-01-12 DIAGNOSIS — S84.02XD INJURY OF LEFT TIBIAL NERVE, SUBSEQUENT ENCOUNTER: ICD-10-CM

## 2023-01-12 DIAGNOSIS — I77.89 POPLITEAL ARTERY ENTRAPMENT SYNDROME (H): Primary | ICD-10-CM

## 2023-01-12 PROCEDURE — 97110 THERAPEUTIC EXERCISES: CPT | Mod: GP

## 2023-01-12 PROCEDURE — 97116 GAIT TRAINING THERAPY: CPT | Mod: GP

## 2023-01-18 ENCOUNTER — THERAPY VISIT (OUTPATIENT)
Dept: PHYSICAL THERAPY | Facility: CLINIC | Age: 28
End: 2023-01-18
Payer: COMMERCIAL

## 2023-01-18 DIAGNOSIS — S84.02XD INJURY OF LEFT TIBIAL NERVE, SUBSEQUENT ENCOUNTER: ICD-10-CM

## 2023-01-18 DIAGNOSIS — I77.89 POPLITEAL ARTERY ENTRAPMENT SYNDROME (H): Primary | ICD-10-CM

## 2023-01-18 PROCEDURE — 97112 NEUROMUSCULAR REEDUCATION: CPT | Mod: GP

## 2023-01-18 PROCEDURE — 97110 THERAPEUTIC EXERCISES: CPT | Mod: GP

## 2023-01-31 ENCOUNTER — THERAPY VISIT (OUTPATIENT)
Dept: PHYSICAL THERAPY | Facility: CLINIC | Age: 28
End: 2023-01-31
Payer: COMMERCIAL

## 2023-01-31 DIAGNOSIS — I77.89 POPLITEAL ARTERY ENTRAPMENT SYNDROME (H): Primary | ICD-10-CM

## 2023-01-31 DIAGNOSIS — S84.02XD INJURY OF LEFT TIBIAL NERVE, SUBSEQUENT ENCOUNTER: ICD-10-CM

## 2023-01-31 PROCEDURE — 97112 NEUROMUSCULAR REEDUCATION: CPT | Mod: GP

## 2023-01-31 PROCEDURE — 97110 THERAPEUTIC EXERCISES: CPT | Mod: GP

## 2023-02-07 ENCOUNTER — THERAPY VISIT (OUTPATIENT)
Dept: PHYSICAL THERAPY | Facility: CLINIC | Age: 28
End: 2023-02-07
Payer: COMMERCIAL

## 2023-02-07 DIAGNOSIS — S84.02XD INJURY OF LEFT TIBIAL NERVE, SUBSEQUENT ENCOUNTER: ICD-10-CM

## 2023-02-07 DIAGNOSIS — I77.89 POPLITEAL ARTERY ENTRAPMENT SYNDROME (H): Primary | ICD-10-CM

## 2023-02-07 PROCEDURE — 97112 NEUROMUSCULAR REEDUCATION: CPT | Mod: GP

## 2023-02-07 PROCEDURE — 97110 THERAPEUTIC EXERCISES: CPT | Mod: GP

## 2023-02-11 ENCOUNTER — HEALTH MAINTENANCE LETTER (OUTPATIENT)
Age: 28
End: 2023-02-11

## 2023-02-15 ENCOUNTER — THERAPY VISIT (OUTPATIENT)
Dept: PHYSICAL THERAPY | Facility: CLINIC | Age: 28
End: 2023-02-15
Payer: COMMERCIAL

## 2023-02-15 DIAGNOSIS — I77.89 POPLITEAL ARTERY ENTRAPMENT SYNDROME (H): Primary | ICD-10-CM

## 2023-02-15 DIAGNOSIS — S84.02XD INJURY OF LEFT TIBIAL NERVE, SUBSEQUENT ENCOUNTER: ICD-10-CM

## 2023-02-15 PROCEDURE — 97110 THERAPEUTIC EXERCISES: CPT | Mod: GP

## 2023-02-15 NOTE — PROGRESS NOTES
PROGRESS  REPORT    Progress reporting period is from 11/22/22 to 2/15/23.       SUBJECTIVE  Subjective changes noted by patient: Binu izquierdo has been busy w/ coursework/studying so hasn't done much for physical ex this past wk. Overall her function is progressing well. She is doing HEP and strength training regularly, noting some mild LLE edema the day following. Sensation in her L ankle/calf is improved and having less pain. Her PT goals are to improve strength and eventually return to running    Current pain level is 0/10  .     Changes in function:  Yes (See Goal flowsheet attached for changes in current functional level)  Adverse reaction to treatment or activity: None    OBJECTIVE  Changes noted in objective findings:  Yes,   Objective: L ankle AROM: DF 5, inv 30, ev 10. L ankle MMT 5/5 painfree, glute med 4/5 L, 4+/5 on R. Glute max 4+/5 bilat. Improved strength and ROM compared to IE and pt now symmetrical to R. Has pelvic drop, hip adduction/IR, and ankle pronation w/ attempted single leg squat and stepping down ex today. Cues needed for dynamic control. Well healing scar that is not TTP and has good mobility overall     ASSESSMENT/PLAN  Updated problem list and treatment plan: Diagnosis 1:  L ankle impairment s/p L soleus tendon release and tib n neurolysis Pain -  hot/cold therapy, US, electric stimulation, mechanical traction, manual therapy, STS, splint/taping/bracing/orthotics, self management, education, directional preference exercise and home program  Decreased ROM/flexibility - manual therapy, therapeutic exercise and therapeutic activity  Decreased strength - therapeutic exercise, therapeutic activities and home program  Impaired balance - neuro re-education, gait training, therapeutic activities, adaptive equipment/assistive device and home program  Impaired gait - gait training, assistive devices and home program  Impaired muscle performance - biofeedback, electric stimulation and neuro  re-education  Decreased function - therapeutic activities, home program and functional performance testing  STG/LTGs have been met or progress has been made towards goals:  Yes (See Goal flow sheet completed today.)  Assessment of Progress: The patient's condition is improving.  Self Management Plans:  Patient has been instructed in a home treatment program.  Patient  has been instructed in self management of symptoms.  I have re-evaluated this patient and find that the nature, scope, duration and intensity of the therapy is appropriate for the medical condition of the patient.  Elizabetho continues to require the following intervention to meet STG and LTG's:  PT    Recommendations:  This patient would benefit from continued therapy.     Frequency:  1 X week, once daily  Duration:  for 3 months    Please refer to the daily flowsheet for treatment today, total treatment time and time spent performing 1:1 timed codes.    Angeline Rios, PT, DPT

## 2023-02-15 NOTE — PROGRESS NOTES
[facility]    OUTPATIENT Physical Therapy ORTHOPEDIC EVALUATION  PLAN OF TREATMENT FOR OUTPATIENT REHABILITATION  (COMPLETE FOR INITIAL CLAIMS ONLY)  Patient's Last Name, First Name, M.I.  YOB: 1995  JongElizabeththong  Soraida    Provider s Name:  [facility]   Medical Record No.  [id number]   Start of Care Date:  11/22/22   Onset Date:  11/04/22    Treatment Diagnosis:  L ankle impairment s/p L soleus tendon release and tib n neurolysis Medical Diagnosis:  Data Unavailable       Goals:     02/15/23 0500   Body Part   Goals listed below are for L LE Postop   Goal #1   Goal #1 ambulation   Current Functional Level Minutes patient can walk;with 2 crutches   Performance Level 5   STG Target Performance Minutes patient will be able to walk   Performance Level 10 min w/ least restrictive AD and pain <4/10   Rationale for safe household ambulation;for safe outdoor household ambulation;for safe community ambulation;for safe work place ambulation;to promote a healthy and active lifestyle;to maintain proper body mechanics/posture while ambulating to avoid additional compensatory injury due to improper gait mechanics   Due Date 12/20/22   Date Goal Met 12/20/22    LTG Target Performance Minutes patient will be able to  walk   Performance Level 30 min w/ least restrictive AD and pain <4/10   Rationale for safe household ambulation;for safe outdoor household ambulation;for safe community ambulation;for safe work place ambulation;to maintain proper body mechanics/posture while ambulating to avoid additional compensatory injury due to improper gait mechanics;to promote a healthy and active lifestyle   Due Date 02/14/23   Date Goal Met 02/15/23   Goal #2   Goal #2 squatting/kneeling   Previous Functional Level No restrictions   Current Functional Level Can do a partial squat    Performance Level Very poor single-leg stability w/ hip adduction/IR and ankle pronation during single-leg squat or stepping   STG Target Performance Do a partial squat   Performance Level Partial single-leg squat w/ good form and no pain   Rationale for job requirements in their work place;for proper body mechanics while performing housework;for proper body mechanics while performing yardwork and home maintenance;for proper body mechanics when lifting, personal hygiene, dressing  (return to sport)   Due date 03/15/23   LTG Target Performance Do a full squat   Performance Level Complete single-leg squat w/ good form and no pain   Rationale for job requirements in their work place;for proper body mechanics while performing housework;for proper body mechanics while performing yardwork and home maintenance;for proper body mechanics when lifting, personal hygiene, dressing  (return to sport)   Due date 05/10/23       Therapy Frequency:  1x/wk  Predicted Duration of Therapy Intervention:  12 wk    TIFF CATSRO, PT                 I CERTIFY THE NEED FOR THESE SERVICES FURNISHED UNDER        THIS PLAN OF TREATMENT AND WHILE UNDER MY CARE     (Physician co-signature of this document indicates review and certification of the therapy plan).                     Certification Date From:  02/15/23   Certification Date To:  05/10/23    Referring Provider:  Flavia Diallo    Initial Assessment        See Epic Evaluation SOC Date: 11/22/22

## 2023-02-21 ENCOUNTER — THERAPY VISIT (OUTPATIENT)
Dept: PHYSICAL THERAPY | Facility: CLINIC | Age: 28
End: 2023-02-21
Payer: COMMERCIAL

## 2023-02-21 DIAGNOSIS — S84.02XD INJURY OF LEFT TIBIAL NERVE, SUBSEQUENT ENCOUNTER: ICD-10-CM

## 2023-02-21 DIAGNOSIS — I77.89 POPLITEAL ARTERY ENTRAPMENT SYNDROME (H): Primary | ICD-10-CM

## 2023-02-21 PROCEDURE — 97110 THERAPEUTIC EXERCISES: CPT | Mod: GP

## 2023-02-21 PROCEDURE — 97112 NEUROMUSCULAR REEDUCATION: CPT | Mod: GP

## 2023-03-14 ENCOUNTER — TELEPHONE (OUTPATIENT)
Dept: PHYSICAL THERAPY | Facility: CLINIC | Age: 28
End: 2023-03-14

## 2023-03-24 ENCOUNTER — THERAPY VISIT (OUTPATIENT)
Dept: PHYSICAL THERAPY | Facility: CLINIC | Age: 28
End: 2023-03-24
Payer: COMMERCIAL

## 2023-03-24 DIAGNOSIS — I77.89 POPLITEAL ARTERY ENTRAPMENT SYNDROME (H): Primary | ICD-10-CM

## 2023-03-24 DIAGNOSIS — S84.02XD INJURY OF LEFT TIBIAL NERVE, SUBSEQUENT ENCOUNTER: ICD-10-CM

## 2023-03-24 PROCEDURE — 97110 THERAPEUTIC EXERCISES: CPT | Mod: GP

## 2023-03-28 ENCOUNTER — THERAPY VISIT (OUTPATIENT)
Dept: PHYSICAL THERAPY | Facility: CLINIC | Age: 28
End: 2023-03-28
Payer: COMMERCIAL

## 2023-03-28 DIAGNOSIS — S84.02XD INJURY OF LEFT TIBIAL NERVE, SUBSEQUENT ENCOUNTER: ICD-10-CM

## 2023-03-28 DIAGNOSIS — I77.89 POPLITEAL ARTERY ENTRAPMENT SYNDROME (H): Primary | ICD-10-CM

## 2023-03-28 PROCEDURE — 97110 THERAPEUTIC EXERCISES: CPT | Mod: GP

## 2023-03-28 NOTE — PROGRESS NOTES
"   03/28/23 0500   Treating Provider   Treating Provider Modesta Sims, SPT // Direct Supervision: Angeline Rios, PT, DPT   Contact Information   Minutes: Therapeutic exercise 38   Rxs Authorized E&T   Diagnosis L ankle impairment s/p L soleus tendon release and tib n neurolysis   Insurance Medicaid   SOC Date 11/22/22   Beginning of Cert date period 02/15/23   End of Cert period date 05/10/23   Therapy Frequency 1x/wk   Predicted Duration of Therapy Intervention (days/wks) 12 wk   DOS 11/04/22   Date SOAP completed 03/24/23   Date PN/DC completed 02/15/23   Date/PN needed/next MD appt 05/10/23   Subjective Pt reports exercises are going well and L ankle pain is better managed. First step out of bed in morning is still painful, but alleviated with stretching. Has been mindful of wearing supportive footwear with cushioning around heel. She has returned to working out in the gym and still hopes to return to running in the future.   Objective Anterior excursion balance test - Right: 32 inches, Left: 28 inches   Current Pain level 0/10   Other pertinent information 5 min late   PTRX Therapeutic Exercise   Therapeutic Exercise 1 Towel Stretch Gastroc   Therapeutic Exercise Notes 1 No Notes   Therapeutic Exercise 2 Theraband Ankle Plantarflexion   Therapeutic Exercise Notes 2 No notes   Therapeutic Exercise 3 Seated Ankle Soleus Stretch With Towel    Therapeutic Exercise Notes 3 No Notes   Therapeutic Exercise 4 Manual Plantar Fascia Stretch   Therapeutic Exercise Notes 4 No Notes   Therapeutic Exercise 5 Standing Soleus Stretch   Therapeutic Exercise Notes 5 2x30 sec hold bilat   Therapeutic Exercise 6 Standing Gastroc Stretch   Therapeutic Exercise Notes 6 2x30 sec hold bilat   Therapeutic Exercise 7 Eccentric Gastroc Stretch   Therapeutic Exercise Notes 7 x60 sec hold off 4 in step   Therapeutic Exercise 8 Functional Lunge Forward   Therapeutic Exercise Notes 8 x10 w/ single UE support on table. Pt c/o \"pulling\" on L " achilles, but tolerable. Good hip stability.   Therapeutic Exercise 9 Toe Raises   Therapeutic Exercise Notes 9 x10 on 4 in step   Therapeutic Exercise 10 Eccentric Toe Raise on Flat Surface-Gastroc   Therapeutic Exercise Notes 10 Single leg x10 on flat ground   Therapeutic Exercise 11 Plantar Fascia Release/Bottle Roll   Therapeutic Exercise Notes 11 No Notes   Other Exer/Activities/Educ - All exercises are part of HEP unless otherwise noted   Exercise 1 TE   Description 1 Step-ups on 6 in step w/ 10# dumbbells x10   Exercise 2 TE   Description 2 Forward step-downs on 4 in step, single UE support x10 bilat. Good hip stability today.   Exercise 3 TE   Description 3 Single leg squat, sliding disc anteriorly x8 bilat (see objective for measurements)   Assessment   Changes in function Yes, see goal flow sheet for change in function   Adverse reactions None   Assessment of progress Pt is progressing well   Progressing and is ready to progress to more complex exercises   Rx response: improvement in pain level;ROM;strength;function   Progress towards STG/LTG Continues to require PT intervention to meet STG/LTG   Plan   Plan Advance current Rx program to more complex exercises

## 2023-04-11 ENCOUNTER — THERAPY VISIT (OUTPATIENT)
Dept: PHYSICAL THERAPY | Facility: CLINIC | Age: 28
End: 2023-04-11
Payer: COMMERCIAL

## 2023-04-11 DIAGNOSIS — S84.02XD INJURY OF LEFT TIBIAL NERVE, SUBSEQUENT ENCOUNTER: ICD-10-CM

## 2023-04-11 DIAGNOSIS — I77.89 POPLITEAL ARTERY ENTRAPMENT SYNDROME (H): Primary | ICD-10-CM

## 2023-04-11 PROCEDURE — 97110 THERAPEUTIC EXERCISES: CPT | Mod: GP

## 2023-04-11 PROCEDURE — 97530 THERAPEUTIC ACTIVITIES: CPT | Mod: GP

## 2023-04-11 NOTE — PROGRESS NOTES
04/11/23 0500   Treating Provider   Treating Provider Modesta Sims, SPT // Direct Supervision: Angeline Rios, PT, DPT   Contact Information   Minutes: Therapeutic exercise 23   Minutes: Therapeutic activities 10   Rxs Authorized E&T   Diagnosis L ankle impairment s/p L soleus tendon release and tib n neurolysis   Insurance Medicaid   SOC Date 11/22/22   Beginning of Cert date period 02/15/23   End of Cert period date 05/10/23   Therapy Frequency 1x/wk   Predicted Duration of Therapy Intervention (days/wks) 12 wk   DOS 11/04/22   Date SOAP completed 03/24/23   Date PN/DC completed 02/15/23   Date/PN needed/next MD appt 05/10/23   Subjective Plantar fasciitis is getting better. Having issues with quadriceps and calves cramping after doing squats w/ barbell. L cramp continues to be swollen at surgical site after workouts. Last night, had a very bad L calf cramp and couldn't go back to sleep. Currently going to the gym 3-4x day. Interested in reviewing gym routine and looking for exercise modifications to reduce cramping   Objective Mild edema around surgical site in L medial calf. Anterior excursion balance test - Right: 26 inches, Left: 20 inches. Achilles and calves feel pretty tight today.   Other pertinent information 12 min late   PTRX Therapeutic Exercise   Therapeutic Exercise 1 Towel Stretch Gastroc   Therapeutic Exercise Notes 1 Reviewed   Therapeutic Exercise 2 Theraband Ankle Plantarflexion   Therapeutic Exercise Notes 2 No notes   Therapeutic Exercise 3 Seated Ankle Soleus Stretch With Towel    Therapeutic Exercise Notes 3 No Notes   Therapeutic Exercise 4 Manual Plantar Fascia Stretch   Therapeutic Exercise Notes 4 No Notes   Therapeutic Exercise 5 Standing Soleus Stretch   Therapeutic Exercise Notes 5 2x30 sec hold bilat   Therapeutic Exercise 6 Standing Gastroc Stretch   Therapeutic Exercise Notes 6 2x30 sec hold bilat   Therapeutic Exercise 7 Eccentric Gastroc Stretch   Therapeutic Exercise Notes 7  No notes   Therapeutic Exercise 8 Functional Lunge Forward   Therapeutic Exercise Notes 8 x10 bilat   Therapeutic Exercise 9 Toe Raises   Therapeutic Exercise Notes 9 x10 on ground double leg and single leg (L)   Therapeutic Exercise 10 Eccentric Toe Raise on Flat Surface-Gastroc   Therapeutic Exercise Notes 10 No notes   Therapeutic Exercise 11 Plantar Fascia Release/Bottle Roll   Therapeutic Exercise Notes 11 No Notes   Other Exer/Activities/Educ - All exercises are part of HEP unless otherwise noted   Description 1 TA   Exercise 2 Educated pt on optimal squatting mechanics to reduce quad and calf strain. Pt performed x10 back squats with wooden dowel, using mirror for external feedback on form. Emphasis on keeping chest up and reducing depth of squat by 20%. Pt reported reduced LE discomfort with form modifications   Description 2 TA   Exercise 3 Discussed strategies to manage muscle cramping and calf edema at home. Recommended using compression stocking on L LE to manage swelling around surgical site. Educated pt on effleurage massage to L medial calf. Also discussed importance of adequate hydration and active warm-up prior to initiating strength training. Pt agreeable to recommendations.   Description 3 TE   Exercise 4 Squats with 10# weight x10   Description 4 TE   Exercise 5 Single leg squat, sliding disc anteriorly x8 bilat (see objective for measurements)   Assessment   Changes in function Yes, see goal flow sheet for change in function   Adverse reactions None   Assessment of progress Pt is progressing well   Rx response: improvement in function   Progress towards STG/LTG Continues to require PT intervention to meet STG/LTG   Plan   Plan Continue same Rx plan until Pt demonstrates readiness to progress to more complex exercises

## 2023-04-25 ENCOUNTER — THERAPY VISIT (OUTPATIENT)
Dept: PHYSICAL THERAPY | Facility: CLINIC | Age: 28
End: 2023-04-25
Payer: COMMERCIAL

## 2023-04-25 DIAGNOSIS — I77.89 POPLITEAL ARTERY ENTRAPMENT SYNDROME (H): Primary | ICD-10-CM

## 2023-04-25 DIAGNOSIS — S84.02XD INJURY OF LEFT TIBIAL NERVE, SUBSEQUENT ENCOUNTER: ICD-10-CM

## 2023-04-25 PROCEDURE — 97110 THERAPEUTIC EXERCISES: CPT | Mod: GP

## 2023-04-25 PROCEDURE — 97116 GAIT TRAINING THERAPY: CPT | Mod: GP

## 2023-04-25 NOTE — PROGRESS NOTES
04/25/23 0500   Treating Provider   Treating Provider Modesta Sims, SPT // Direct Supervision: Angeline Rios, PT, DPT   Contact Information   Minutes: Therapeutic exercise 25   Minutes: Gait 8   Rxs Authorized E&T   Rxs Used 12   Diagnosis L ankle impairment s/p L soleus tendon release and tib n neurolysis   Insurance Medicaid   SOC Date 11/22/22   Beginning of Cert date period 02/15/23   End of Cert period date 05/10/23   Therapy Frequency 1x/wk   Predicted Duration of Therapy Intervention (days/wks) 12 wk   DOS 11/04/22   Date SOAP completed 03/24/23   Date PN/DC completed 02/15/23   Date/PN needed/next MD appt 05/10/23   Subjective Pt reports that on 4/13 she was briskly walking from her workplace to a restaurant on campus and felt her leg give out in similar way prior to surgery. Wasn't able to step forward and was limping for the rest of her walk. Had another similar incident last week while quickly walking, where she noticed her leg getting considerably fatigued after 5 min. Recently purchased some Hokas with insoles and that's been very helpful for plantar fasciitis. Has been continuing to do massage with tennis ball and gastroc stretching. Also got compression socks and they have been very helpful for managing calf swelling.   Objective 3+/5 hip flexor strength L, 5/5 hip flexor strength R. 3+/5 hip abd strength bilat. R hip hiking with L calf raises. Decreased toe push-off with L foot during gait.   Other pertinent information 5 min late   PTRX Therapeutic Exercise   Therapeutic Exercise 1 Towel Stretch Gastroc   Therapeutic Exercise Notes 1 No Notes   Therapeutic Exercise 2 Theraband Ankle Plantarflexion   Therapeutic Exercise Notes 2 No Notes   Therapeutic Exercise 3 Seated Ankle Soleus Stretch With Towel    Therapeutic Exercise Notes 3 No Notes   Therapeutic Exercise 4 Manual Plantar Fascia Stretch   Therapeutic Exercise Notes 4 No Notes   Therapeutic Exercise 5 Standing Soleus Stretch   Therapeutic  Exercise Notes 5 Reviewed   Therapeutic Exercise 6 Standing Gastroc Stretch   Therapeutic Exercise Notes 6 Reviewed   Therapeutic Exercise 7 Eccentric Gastroc Stretch   Therapeutic Exercise Notes 7 Reviewed on 6 in step   Therapeutic Exercise 8 Functional Lunge Forward   Therapeutic Exercise Notes 8 No Notes   Therapeutic Exercise 9 Eccentric Toe Raise on Flat Surface-Gastroc   Therapeutic Exercise Notes 9 No Notes   Therapeutic Exercise 10 Plantar Fascia Release/Bottle Roll   Therapeutic Exercise Notes 10 No Notes   Therapeutic Exercise 11 Hip Flexion Straight Leg Raise   Therapeutic Exercise Notes 11 2x10 with 2# ankle weight and cues to engage abdominals   Therapeutic Exercise 12 Prone Plank Modified Knees   Therapeutic Exercise Notes 12 x30 sec hold with cues to brace abd and reduce excessive lumbar lordosis   Therapeutic Exercise 13 Towel Gather   Therapeutic Exercise Notes 13 3x10 seated. Pt reported mild arch cramping after each set, but resolved with rest and stretching   Therapeutic Exercise 14 Toe Raises   Therapeutic Exercise Notes 14 2x10 on L LE with cues to keeps hips level   Therapeutic Exercise 15 Side Stepping With Theraband   Therapeutic Exercise Notes 15 2x10 with GTB   PTRX Neuromuscular Re-education    Neuromuscular Re-education 1 Abdominal Brace Transverse Abdominis   Neuromuscular Re-Education Exercise 1 x10 with 5 sec hold and cues to press low back into table   Other Exer/Activities/Educ - All exercises are part of HEP unless otherwise noted   Exercise 1 Gait Training   Description 1 Treadmill: speed 2.0 mph with incline lvl 1. Forward amb x7 min before L LE fatigue. Pt demos decreased push-off with L forefoot and larger stride length. Also noted hip drop  throughout amb and hyperext of L LE with fatigue. Cues to increase push-off, decrease stride length, and put hands on hips were helpful for improving hip stability and reducing torque at LE. Time taken to discuss observations and write  down reminders for optimal walking mechanics. Pt will try walking on treadmill at gym and incorporate these strategies.   Assessment   Changes in function No changes noted in function since last SOAP note   Adverse reactions None   Assessment of progress Pt is progressing slower than anticipated   Rx response: lack of progress gait;function;strength   Progress towards STG/LTG Continues to require PT intervention to meet STG/LTG   Plan   Plan Continue same Rx plan until Pt demonstrates readiness to progress to more complex exercises

## 2023-05-30 ENCOUNTER — MYC MEDICAL ADVICE (OUTPATIENT)
Dept: VASCULAR SURGERY | Facility: CLINIC | Age: 28
End: 2023-05-30
Payer: COMMERCIAL

## 2023-05-30 DIAGNOSIS — M79.605 LEFT LEG PAIN: ICD-10-CM

## 2023-05-30 DIAGNOSIS — I77.89 POPLITEAL ARTERY ENTRAPMENT SYNDROME (H): Primary | ICD-10-CM

## 2023-05-31 ENCOUNTER — ANCILLARY PROCEDURE (OUTPATIENT)
Dept: ULTRASOUND IMAGING | Facility: CLINIC | Age: 28
End: 2023-05-31
Attending: SURGERY
Payer: COMMERCIAL

## 2023-05-31 DIAGNOSIS — M79.605 LEFT LEG PAIN: ICD-10-CM

## 2023-05-31 DIAGNOSIS — I77.89 POPLITEAL ARTERY ENTRAPMENT SYNDROME (H): ICD-10-CM

## 2023-05-31 PROCEDURE — 93924 LWR XTR VASC STDY BILAT: CPT | Performed by: RADIOLOGY

## 2023-06-01 ENCOUNTER — TELEPHONE (OUTPATIENT)
Dept: VASCULAR SURGERY | Facility: CLINIC | Age: 28
End: 2023-06-01
Payer: COMMERCIAL

## 2023-06-01 NOTE — TELEPHONE ENCOUNTER
Dr Diallo reviewed pt's ABIs - no need for vascular intervention at this time. Recommend pt follow-up with neurology to determine next steps. Spoke with pt regarding these recommendations - she verbalized understanding.    LEBRON BandaN, RN  [facility] - [facility] Vascular Surgery  Ph: [phone]  Fax: [phone]

## 2023-06-09 NOTE — PROGRESS NOTES
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 There was significant improvement; She can now feel her toes.   The remaining issue is pain/ swelling/ weakness when walking -> 5-6 min in to walking based on speed/ distance walked. If she keeps pushing through, gets a burning sensation and foot drop. Typical pattern is walking 1-2 miles, gets symptoms. Rests and symptoms resolve within 10 minutes. Then symptoms start again after 10 min of walking. Weakness starts 15-20 min into walking; Feels like she has to alter gait and use proximal muscles to prevent from tripping  After  surgery, notes hypersensitivity below scar along medial calf     LYLE's 5/31/23:  IMPRESSION:   1. RIGHT LEG:       A. Resting LYLE is normal, 1.12.       B. Exercise study: Negative     2. LEFT LEG:       A. Resting LYLE is normal, 1.14.       B. 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 Wasn't able to step forward and was limping for the rest of her walk. Had another similar incident last week while quickly walking, where she noticed her leg getting considerably fatigued after 5 min. Recently purchased some Hokas with insoles and that's been very helpful for plantar fasciitis. Has been continuing to do massage with tennis ball and gastroc stretching. Also got compression socks and they have been very helpful for managing calf swelling.    Per Dr Diallo's note on 12/7/22:  HPI: Patient follows up today s/p left soleal sling release for left popliteal artery entrapment and soleal sling syndrome on November 4.  After getting over the initial postoperative discomfort she is noting significant improvement in her function.  She has been able to feel the leg much more appropriately and notes some new sensations where previously the leg had felt deadened.  She is also improving her strength and feels like she is making good progress.  Swelling has improved significantly and using compression appropriately.  Denies chest pain, fever, chills.   AP: Doing well s/p left soleal sling release for popliteal entrapment and soleal sling syndrome. Binu will continue to advance her activity and strengthen her left lower extremity.  I suspect she will be able to be rid of the 1 supportive crutch within the next several weeks.  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  I encouraged her to reach out to me if any additional questions or issues arise.    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   Current Outpatient Medications   Medication Sig Dispense Refill     diclofenac (VOLTAREN) 1 % topical gel Apply 2 g topically 4 times daily 100 g 0      No Known Allergies     ROS:   Gen- no fevers/chills   Derm - no rash/ redness   Neuro - see HPI   Remainder of ROS negative.     Exam:     L leg:  Significant soft tissue swelling of L calf  1+ pitting edema on L to mid calf - this is chronic   Medial scar noted w/ altered sensation surrounding it  +weakness w/ dorsiflexion and unable to walk on heels

## 2023-06-12 ENCOUNTER — OFFICE VISIT (OUTPATIENT)
Dept: ORTHOPEDICS | Facility: CLINIC | Age: 28
End: 2023-06-12
Payer: COMMERCIAL

## 2023-06-12 DIAGNOSIS — M62.81 CALF MUSCLE WEAKNESS: ICD-10-CM

## 2023-06-12 DIAGNOSIS — I77.89 POPLITEAL ARTERY ENTRAPMENT SYNDROME (H): Primary | ICD-10-CM

## 2023-06-12 DIAGNOSIS — G57.40: ICD-10-CM

## 2023-06-12 PROCEDURE — 99214 OFFICE O/P EST MOD 30 MIN: CPT | Performed by: FAMILY MEDICINE

## 2023-06-12 NOTE — LETTER
6/12/2023      RE: Binu Sunshine  [address]  [facility] [address]     Dear Colleague,    Thank you for referring your patient, Binu Sunshine, to the [facility] [place]. Please see a copy of my visit note below.    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 There was significant improvement; She can now feel her toes.   The remaining issue is pain/ swelling/ weakness when walking -> 5-6 min in to walking based on speed/ distance walked. If she keeps pushing through, gets a burning sensation and foot drop.  Typical pattern is walking 1-2 miles, gets symptoms. Rests and symptoms resolve within 10 minutes. Then symptoms start again after 10 min of walking. Weakness starts 15-20 min into walking; Feels like she has to alter gait and use proximal muscles to prevent from tripping  After surgery, notes hypersensitivity below scar along medial calf     LYLE's 5/31/23:  IMPRESSION:   1. RIGHT LEG:       A. Resting LYLE is normal, 1.12.       B. Exercise study: Negative     2. LEFT LEG:       A. Resting LYLE is normal, 1.14.       B. 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 Wasn't able to step forward and was limping for the rest of her walk. Had another similar incident last week while quickly walking, where she noticed her leg getting considerably fatigued after 5 min. Recently purchased some Hokas with insoles and that's been very helpful for plantar fasciitis. Has been continuing to do massage with tennis ball and gastroc stretching. Also got compression socks and they have been very helpful for managing calf swelling.    Per Dr Diallo's note on 12/7/22:  HPI: Patient follows up today s/p left soleal sling release for left popliteal artery entrapment and soleal sling syndrome on November 4.  After getting over the initial postoperative discomfort she is noting significant improvement in her function.  She has been able to feel the leg much more appropriately and notes some new sensations where previously the leg had felt deadened.  She is also improving her strength and feels like she is making good progress.  Swelling has improved significantly and using compression appropriately.  Denies chest pain, fever, chills.   AP: Doing well s/p left soleal sling release for popliteal entrapment and soleal sling syndrome. Binu will continue to advance her activity and  strengthen her left lower extremity.  I suspect she will be able to be rid of the 1 supportive crutch within the next several weeks.  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  I encouraged her to reach out to me if any additional questions or issues arise.    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   Current Outpatient Medications   Medication Sig Dispense Refill    diclofenac (VOLTAREN) 1 % topical gel Apply 2 g topically 4 times daily 100 g 0      No Known Allergies     ROS:   Gen- no fevers/chills   Derm - no rash/ redness   Neuro - see HPI   Remainder of ROS negative.     Exam:     L leg:  Significant soft tissue swelling of L calf  1+ pitting edema on L to mid calf - this is chronic   Medial scar noted w/ altered sensation surrounding it  +weakness w/ dorsiflexion and unable to walk on heels      Again, thank you for allowing me to participate in the care of your patient.      Sincerely,    Brandon Felder MD

## 2023-06-12 NOTE — Clinical Note
Lon - sending you this really interesting case. Soleal sling syndrome s/p release w/ recurrence of symptoms. She may actually have posterior compartment syndrome but I'm rechecking an MRI and EMG. Please let me know what questions you have. She is super nice. I feel bad for her. - Brandon

## 2023-06-23 ENCOUNTER — ANCILLARY PROCEDURE (OUTPATIENT)
Dept: MRI IMAGING | Facility: CLINIC | Age: 28
End: 2023-06-23
Attending: FAMILY MEDICINE
Payer: COMMERCIAL

## 2023-06-23 DIAGNOSIS — I77.89 POPLITEAL ARTERY ENTRAPMENT SYNDROME (H): ICD-10-CM

## 2023-06-23 DIAGNOSIS — G57.40: ICD-10-CM

## 2023-06-23 DIAGNOSIS — M62.81 CALF MUSCLE WEAKNESS: ICD-10-CM

## 2023-06-23 PROCEDURE — 73718 MRI LOWER EXTREMITY W/O DYE: CPT | Mod: LT | Performed by: RADIOLOGY

## 2023-06-30 NOTE — PROGRESS NOTES
DISCHARGE  Reason for Discharge: Patient has failed to schedule further appointments.    Equipment Issued: none    Discharge Plan: Patient to continue home program.    Referring Provider:  Flavia Diallo

## 2023-07-17 ENCOUNTER — OFFICE VISIT (OUTPATIENT)
Dept: NEUROLOGY | Facility: CLINIC | Age: 28
End: 2023-07-17
Attending: FAMILY MEDICINE
Payer: COMMERCIAL

## 2023-07-17 DIAGNOSIS — M62.81 CALF MUSCLE WEAKNESS: ICD-10-CM

## 2023-07-17 PROCEDURE — 95909 NRV CNDJ TST 5-6 STUDIES: CPT | Performed by: INTERNAL MEDICINE

## 2023-07-17 PROCEDURE — 95886 MUSC TEST DONE W/N TEST COMP: CPT | Mod: LT | Performed by: INTERNAL MEDICINE

## 2023-07-17 NOTE — LETTER
2023       RE: Binu Sunshine  [address]  [facility] [address]       Dear Colleague,    Thank you for referring your patient, Binu Sunshine, to the [facility] [place] [facility]. Please see a copy of my visit note below.                        [facility]  Electrodiagnostic Laboratory                 Department of Neurology                                                                                                         Test Date:  2023    Patient: Binu Sunshine : 1995 Physician: Toro Cota MD   Sex: Male AGE: 27 year Ref Phys:    ID#: [id number]   Technician: Kristy Behling     History and Examination:  Patient is a 28 y/o female with history of popliteal artery entrapment syndrome s/p release who presents for evaluation of left lower extremity weakness. EMG ordered to evaluate for focal neuropathy.     Techniques:  Motor conduction studies were done with surface recording electrodes. Sensory conduction studies were performed with surface electrodes, unless indicated otherwise by (n), designating the use of subdermal recording electrodes. Temperature was monitored and recorded throughout the study. Upper extremities were maintained at a temperature of 32 degrees Centigrade or higher.  EMG was done with a concentric needle electrode.     Results:  All sensory and motor nerve conduction studies were normal. F Wave studies indicate that the left Fibular F wave has prolonged latency (73.31 ms).  All remaining F Wave latencies were within normal limits.      Needle evaluation of the left Tibialis Anterior and the left iliopsoas muscles showed moderately increased motor unit amplitude, slightly increased motor unit duration, and recruitment.  The left Gastrocnemius muscle showed increased Fibs/PSW, moderately increased motor unit amplitude, slightly increased motor unit duration, and  recruitment.  The left Tibialis Posterior muscle showed increased insertional activity, moderately increased Fibs/PSW, moderately increased motor unit amplitude, moderately increased motor unit duration, and moderately reduced recruitment.  The left Vastus Medius muscle showed increased Fibs/PSW, motor unit amplitude, moderately increased motor unit duration, and moderately reduced recruitment.  The left Gluteus Medius muscle showed slightly increased motor unit amplitude and slightly increased motor unit duration.  All remaining muscles (as indicated in the following table) showed no evidence of electrical instability.        Interpretation:  This is an abnormal examination. 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 Neuromuscular consultation could also be considered.   ___________________________  Toro Cota MD        Nerve Conduction Studies  Motor Sites      Latency Amplitude Neg. Amp Diff Segment Distance Velocity Neg. Dur Neg Area Diff Temperature Comment   Site (ms) Norm (mV) Norm %  cm m/s Norm ms %  C    Left Fibular (EDB) Motor   Ankle 4.5  < 6.0 4.2  > 2.5  Ankle-EDB 8   6.7  28.4    Bel Fib Head 10.9 - 4.4 - 4.8 Bel Fib Head-Ankle 29 45  > 38 6.5 2.5 28.4    Pop Fossa 12.6 - 4.5 - 2.3 Pop Fossa-Bel Fib Head 8 47  > 38 6.5 -1.83 28.4    Site 1 5.2 - 0.49 - -88.3 Site 1-Ankle - - - 6.6 -89.3 28.4 access per   Left Tibial (AHB) Motor   Ankle 3.5  < 6.5 12.8  > 5.0  Ankle-AHB 8   7.5  28.7    Knee 10.3 - 8.4 - -34.4 Knee-Ankle 38 56  > 38 8.7 -30.5 28.8      Sensory Sites      Onset Lat Peak Lat Amp (O-P) Amp  (P-P) Segment Distance Velocity Temperature Comment   Site ms ms  V Norm  V  cm m/s Norm  C    Left Medial Plantar (Ortho) Sensory   Great Toe-Med Mall 2.2 2.9 22 - 25 Great Toe-Med Mall 13 59 - 29.3    Left Superficial Fibular Sensory   14 cm-Ankle 1.88 2.6 33  > 3 45 14 cm-Ankle 12.5 66  > 38 28.4    Left Sural Sensory   Calf-Lat Mall 2.4 3.0 27  > 5 32 Calf-Lat Mall 14 58  > 38 28.4      F Wave Studies     Min-F Max-F Dispersion Persistence Mean-F F-Norm L-R Mean-F L-R Mean-F Norm F/M Ratio F-M Lat (ms)   Left Fibular (Ext Dig Brev)  28.4  C   70.63 75.00 4.37 50.00 73.31 <60  <5.1 1.26 65.78   Left Tibial (Abd Hallucis)  30.5  C   41.09 55.00 13.91 72.73 49.16 <61  <5.7 4.21 34.69       Electromyography     Side Muscle Ins Act Fibs/PSW Fasc HF Amp Dur Poly Recrt Int Pat   Left Tib Anterior Nml None Nml 0 2+ 1+ 0 River Nml   Left Gastroc Nml 1+ Nml 0 2+ 1+ 0 River Nml   Left Tib Posterior Incr 2+ Nml 0 2+ 2+ 0 ModRed Nml   Left Vastus Med Nml 1+ Nml 0 Giant 2+ 0 ModRed Nml   Left Iliopsoas Nml None Nml 0 2+ 1+ 0 River Nml   Left Gluteus Med Nml None Nml 0 1+ 1+ 0 Nml Nml   Left Gluteus Max Nml None Nml 0 Nml Nml 0 Nml Nml   Left L5 Parasp Nml None Nml 0              NCS Waveforms:    Motor         Sensory                Again, thank you for allowing me to participate in the care of your patient.      Sincerely,    Toro Cota MD

## 2023-07-17 NOTE — PROGRESS NOTES
[facility]  Electrodiagnostic Laboratory                 Department of Neurology                                                                                                         Test Date:  2023    Patient: Binu Sunshine : 1995 Physician: Toro Cota MD   Sex: Male AGE: 27 year Ref Phys:    ID#: [id number]   Technician: Kristy Behling     History and Examination:  Patient is a 28 y/o female with history of popliteal artery entrapment syndrome s/p release who presents for evaluation of left lower extremity weakness. EMG ordered to evaluate for focal neuropathy.     Techniques:  Motor conduction studies were done with surface recording electrodes. Sensory conduction studies were performed with surface electrodes, unless indicated otherwise by (n), designating the use of subdermal recording electrodes. Temperature was monitored and recorded throughout the study. Upper extremities were maintained at a temperature of 32 degrees Centigrade or higher.  EMG was done with a concentric needle electrode.     Results:  All sensory and motor nerve conduction studies were normal. F Wave studies indicate that the left Fibular F wave has prolonged latency (73.31 ms).  All remaining F Wave latencies were within normal limits.      Needle evaluation of the left Tibialis Anterior and the left iliopsoas muscles showed moderately increased motor unit amplitude, slightly increased motor unit duration, and recruitment.  The left Gastrocnemius muscle showed increased Fibs/PSW, moderately increased motor unit amplitude, slightly increased motor unit duration, and recruitment.  The left Tibialis Posterior muscle showed increased insertional activity, moderately increased Fibs/PSW, moderately increased motor unit amplitude, moderately increased motor unit duration, and moderately reduced recruitment.  The left Vastus Medius muscle showed increased Fibs/PSW, motor unit amplitude,  moderately increased motor unit duration, and moderately reduced recruitment.  The left Gluteus Medius muscle showed slightly increased motor unit amplitude and slightly increased motor unit duration.  All remaining muscles (as indicated in the following table) showed no evidence of electrical instability.        Interpretation:  This is an abnormal examination. 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 Neuromuscular consultation could also be considered.   ___________________________  Toro Cota MD        Nerve Conduction Studies  Motor Sites      Latency Amplitude Neg. Amp Diff Segment Distance Velocity Neg. Dur Neg Area Diff Temperature Comment   Site (ms) Norm (mV) Norm %  cm m/s Norm ms %  C    Left Fibular (EDB) Motor   Ankle 4.5  < 6.0 4.2  > 2.5  Ankle-EDB 8   6.7  28.4    Bel Fib Head 10.9 - 4.4 - 4.8 Bel Fib Head-Ankle 29 45  > 38 6.5 2.5 28.4    Pop Fossa 12.6 - 4.5 - 2.3 Pop Fossa-Bel Fib Head 8 47  > 38 6.5 -1.83 28.4    Site 1 5.2 - 0.49 - -88.3 Site 1-Ankle - - - 6.6 -89.3 28.4 access per   Left Tibial (AHB) Motor   Ankle 3.5  < 6.5 12.8  > 5.0  Ankle-AHB 8   7.5  28.7    Knee 10.3 - 8.4 - -34.4 Knee-Ankle 38 56  > 38 8.7 -30.5 28.8      Sensory Sites      Onset Lat Peak Lat Amp (O-P) Amp (P-P) Segment Distance Velocity Temperature Comment   Site ms ms  V Norm  V  cm m/s Norm  C    Left Medial Plantar (Ortho) Sensory   Great Toe-Med Mall 2.2 2.9 22 - 25 Great Toe-Med Mall 13 59 - 29.3    Left Superficial Fibular Sensory   14 cm-Ankle 1.88 2.6 33  > 3 45 14 cm-Ankle 12.5 66  > 38 28.4    Left Sural Sensory    Calf-Lat Mall 2.4 3.0 27  > 5 32 Calf-Lat Mall 14 58  > 38 28.4      F Wave Studies     Min-F Max-F Dispersion Persistence Mean-F F-Norm L-R Mean-F L-R Mean-F Norm F/M Ratio F-M Lat (ms)   Left Fibular (Ext Dig Brev)  28.4  C   70.63 75.00 4.37 50.00 73.31 <60  <5.1 1.26 65.78   Left Tibial (Abd Hallucis)  30.5  C   41.09 55.00 13.91 72.73 49.16 <61  <5.7 4.21 34.69       Electromyography     Side Muscle Ins Act Fibs/PSW Fasc HF Amp Dur Poly Recrt Int Pat   Left Tib Anterior Nml None Nml 0 2+ 1+ 0 River Nml   Left Gastroc Nml 1+ Nml 0 2+ 1+ 0 River Nml   Left Tib Posterior Incr 2+ Nml 0 2+ 2+ 0 ModRed Nml   Left Vastus Med Nml 1+ Nml 0 Giant 2+ 0 ModRed Nml   Left Iliopsoas Nml None Nml 0 2+ 1+ 0 River Nml   Left Gluteus Med Nml None Nml 0 1+ 1+ 0 Nml Nml   Left Gluteus Max Nml None Nml 0 Nml Nml 0 Nml Nml   Left L5 Parasp Nml None Nml 0              NCS Waveforms:    Motor         Sensory

## 2023-08-07 NOTE — TELEPHONE ENCOUNTER
DIAGNOSIS: Left sided posterior compartment syndrome per Dr Felder    APPOINTMENT DATE: 08/09/23   NOTES STATUS DETAILS   OFFICE NOTE from referring provider Internal 06/12/23- Dr. Felder   MEDICATION LIST Internal    MRI Internal MR Tibia+Fibula Lower Left Leg  06/23/23, 08/08/22 - Dr Felder   US Internal US LYLE Doppler  - 05/31/23    US Lower Extremity  - 11/16/22    US L Ext. Arterial Doppler  - 08/08/23   CT SCAN Internal CT Lower Extremity  08/30/22- Flavia Diallo MD

## 2023-08-09 ENCOUNTER — OFFICE VISIT (OUTPATIENT)
Dept: ORTHOPEDICS | Facility: CLINIC | Age: 28
End: 2023-08-09
Payer: COMMERCIAL

## 2023-08-09 ENCOUNTER — PRE VISIT (OUTPATIENT)
Dept: ORTHOPEDICS | Facility: CLINIC | Age: 28
End: 2023-08-09

## 2023-08-09 DIAGNOSIS — G89.29 CHRONIC PAIN OF LEFT LOWER EXTREMITY: Primary | ICD-10-CM

## 2023-08-09 DIAGNOSIS — M79.605 CHRONIC PAIN OF LEFT LOWER EXTREMITY: Primary | ICD-10-CM

## 2023-08-09 PROCEDURE — 99203 OFFICE O/P NEW LOW 30 MIN: CPT | Performed by: FAMILY MEDICINE

## 2023-08-09 NOTE — LETTER
8/9/2023      RE: Binu Sunshine  [address]  [facility] [address]     Dear Colleague,    Thank you for referring your patient, Binu Sunshine, to the [facility] [place]. Please see a copy of my visit note below.                                                                                                                                                                                                                                                                                                                                                                                                                                                                                                                                                                                                                                                                                                                                                                                                                                                                                                                                                                                                                                                                                                                                                                                                                                                                                                                                                                                                                                                                                                                                                                                                                                                                                                                                                     CHIEF COMPLAINT:  Consult for Compartment Syndrome Testing Per [facility]       HISTORY OF PRESENT ILLNESS  Ms. Sunshine is a pleasant 27 year old female who presents to clinic today with chronic left lower leg pain.  Binu has a long, well-documented history of left lower extremity pain.  She has had a extensive workup to this point and has had a popliteal artery decompression.  Her workup includes normal and reassuring ABIs, an MRI of her back that was normal and reassuring, and two separate EMGs that both reveal a L2-S1 polyradiculopathy.  Unfortunately after her surgery and multiple attempts at physical therapy she is continuing to experience left lower extremity weakness, particularly when picking up her foot.  This will present after even short walks of about a half a mile in duration.  This is also worse with quick walking, and presumably a longer stride length.  This is becoming debilitating as it is greatly affecting her life.        Additional history: as documented    MEDICAL HISTORY  There is no problem list on file for this patient.      Current Outpatient Medications   Medication Sig Dispense Refill     diclofenac (VOLTAREN) 1 % topical gel Apply 2 g topically 4 times daily 100 g 0       No Known Allergies    No family history on file.    Additional medical/Social/Surgical histories reviewed in [facility] and updated as appropriate.        PHYSICAL EXAM  General  - normal appearance, in no obvious distress    Musculoskeletal - left lower leg  - stance: normal gait without limp, no obvious leg length discrepancy  - inspection: slightly enlarged left lower leg compared to right  - palpation: tender lower leg, generally   - ROM: FROM of knee and ankle, painless  - strength: 5/5 in all ranges  Neuro  - no sensory or motor deficit, grossly normal coordination, normal muscle tone  Skin  - no ecchymosis, erythema, warmth, or induration, no obvious rash        {Diagnostic Test Results (Optional):941496}     ASSESSMENT &  PLAN  Ms. Sunshine is a 27 year old female who presents to clinic today with chronic left lower leg pain.    I reviewed her chart extensively and also discussed her multiple imaging modalities in the room with her.  Ultimately I do think would be reasonable to perform compartment pressure testing, she can have this done at her earliest convenience.  We can help her schedule this.    It was a pleasure seeing Milko today.    Leon Rivera DO, [facility]  Primary Care Sports Medicine      This note was constructed using Dragon dictation software, please excuse any minor errors in spelling, grammar, or syntax.                                                                                                                              Again, thank you for allowing me to participate in the care of your patient.      Sincerely,    Leon Rivera DO

## 2023-08-09 NOTE — PROGRESS NOTES
CHIEF COMPLAINT:  Consult for Compartment Syndrome Testing Per [facility]       HISTORY OF PRESENT ILLNESS  Ms. Sunshine is a pleasant 27 year old female who presents to clinic today with chronic left lower leg pain.  Binu has a long, well-documented history of left lower  extremity pain.  She has had a extensive workup to this point and has had a popliteal artery decompression.  Her workup includes normal and reassuring ABIs, an MRI of her back that was normal and reassuring, and two separate EMGs that both reveal a L2-S1 polyradiculopathy.  Unfortunately after her surgery and multiple attempts at physical therapy she is continuing to experience left lower extremity weakness, particularly when picking up her foot.  This will present after even short walks of about a half a mile in duration.  This is also worse with quick walking, and presumably a longer stride length.  This is becoming debilitating as it is greatly affecting her life.        Additional history: as documented    MEDICAL HISTORY  There is no problem list on file for this patient.      Current Outpatient Medications   Medication Sig Dispense Refill    diclofenac (VOLTAREN) 1 % topical gel Apply 2 g topically 4 times daily 100 g 0       No Known Allergies    No family history on file.    Additional medical/Social/Surgical histories reviewed in [facility] and updated as appropriate.        PHYSICAL EXAM  General  - normal appearance, in no obvious distress    Musculoskeletal - left lower leg  - stance: normal gait without limp, no obvious leg length discrepancy  - inspection: slightly enlarged left lower leg compared to right  - palpation: tender lower leg, generally   - ROM: FROM of knee and ankle, painless  - strength: 5/5 in all ranges  Neuro  - no sensory or motor deficit, grossly normal coordination, normal muscle tone  Skin  - no ecchymosis, erythema, warmth, or induration, no obvious rash             ASSESSMENT & PLAN  Ms. Sunshine is a 27 year old female who presents to clinic today with chronic left lower leg pain.    I reviewed her chart extensively and also discussed her multiple imaging modalities in the room with her.  Ultimately I do think would be reasonable to perform compartment pressure testing, she can have  this done at her earliest convenience.  We can help her schedule this.    It was a pleasure seeing Elizabetho today.    Leon Rivera DO, [facility]  Primary Care Sports Medicine      This note was constructed using Dragon dictation software, please excuse any minor errors in spelling, grammar, or syntax.

## 2023-09-19 ENCOUNTER — OFFICE VISIT (OUTPATIENT)
Dept: ORTHOPEDICS | Facility: CLINIC | Age: 28
End: 2023-09-19
Payer: COMMERCIAL

## 2023-09-19 DIAGNOSIS — G89.29 CHRONIC PAIN OF LEFT LOWER EXTREMITY: Primary | ICD-10-CM

## 2023-09-19 DIAGNOSIS — M79.605 CHRONIC PAIN OF LEFT LOWER EXTREMITY: Primary | ICD-10-CM

## 2023-09-19 PROCEDURE — 20950 MNTR INTRSTITIAL FLUID PRESS: CPT | Mod: LT | Performed by: FAMILY MEDICINE

## 2023-09-19 ASSESSMENT — PAIN SCALES - GENERAL: PAINLEVEL: MILD PAIN (3)

## 2023-09-19 NOTE — LETTER
9/19/2023         RE: Binu Sunshine  [address]  [place] [address]        Dear Colleague,    Thank you for referring your patient, Binu Sunshine, to the [facility] [place]. Please see a copy of my visit note below.    Binu is here for compartment pressure testing of her left lower extremity.  She continues to have severe pain in her calf with running, this gets worse with any increase in exercise.    Procedure: Compartment pressure testing    Compartments: Bilateral anterior, lateral, superficial posterior, deep posterior  Indication: Chronic lower extremity pain with exertion  Compartments marked, sterile prepping maintained. Lidocaine 2% 1cc analgesia in each area of measurements.  Pre-exercise testing obtained in office. Then pt ran 24 min on treadmill to reproduce maximum symptoms.  Post-exercise testing obtained immediately after finishing treadmill running.         Left Leg               Pre   Post   Anterior   32  46  Lateral   29  42  Deep Posterior  52  59    Impression: positive for Chronic Exertional Compartment Syndrome.    We discussed these results and their implications.  She is very familiar with this discussion given our previous discussion, her discussion with Dr. Felder, and her research.    She is interested in seeing our surgical partners.  I will get in touch with Dr. Felder, we can help facilitate this.    Lon Rivera DO CAQSM      Again, thank you for allowing me to participate in the care of your patient.        Sincerely,        Leon Rivera DO

## 2023-09-19 NOTE — NURSING NOTE
Chief Complaint   Patient presents with    Left Lower Leg - Pain, Follow Up       There were no vitals filed for this visit.    There is no height or weight on file to calculate BMI.      ALEX rIene NREMT

## 2023-09-19 NOTE — PROGRESS NOTES
Binu is here for compartment pressure testing of her left lower extremity.  She continues to have severe pain in her calf with running, this gets worse with any increase in exercise.    Procedure: Compartment pressure testing    Compartments: Bilateral anterior, lateral, superficial posterior, deep posterior  Indication: Chronic lower extremity pain with exertion  Compartments marked, sterile prepping maintained. Lidocaine 2% 1cc analgesia in each area of measurements.  Pre-exercise testing obtained in office. Then pt ran 24 min on treadmill to reproduce maximum symptoms.  Post-exercise testing obtained immediately after finishing treadmill running.  ?      Left Leg               Pre   Post   Anterior   32  46  Lateral   29  42  Deep Posterior  52  59    Impression: positive for Chronic Exertional Compartment Syndrome.    We discussed these results and their implications.  She is very familiar with this discussion given our previous discussion, her discussion with Dr. Felder, and her research.    She is interested in seeing our surgical partners.  I will get in touch with Dr. Felder, we can help facilitate this.    Lon Rivera, DO CAQSM

## 2023-10-12 ENCOUNTER — MYC MEDICAL ADVICE (OUTPATIENT)
Dept: ORTHOPEDICS | Facility: CLINIC | Age: 28
End: 2023-10-12
Payer: COMMERCIAL

## 2023-10-12 DIAGNOSIS — M79.A22 EXERTIONAL COMPARTMENT SYNDROME OF LEFT LOWER EXTREMITY: Primary | ICD-10-CM

## 2023-10-20 NOTE — TELEPHONE ENCOUNTER
DIAGNOSIS: Exertional compartment syndrome; [facility] referral    APPOINTMENT DATE: 10/31/23   NOTES STATUS DETAILS   OFFICE NOTE from referring provider Internal 10/12/23, 06/12/23 - Dr. Felder   OFFICE NOTE from other specialist Internal 09/19/23 - Leon Rivera, DO   EMG report Internal 07/17/23 - Dr. Felder   MEDICATION LIST Internal    MRI Internal MR Tibia+Fibula Lower Left Leg  06/23/23, 08/08/22 - Dr Felder   US Internal US LYLE Doppler  - 05/31/23     US Lower Extremity  - 11/16/22     US L Ext. Arterial Doppler  - 08/08/23   CT SCAN Internal CT Lower Extremity  08/30/22- Flavia Diallo MD

## 2023-10-31 ENCOUNTER — OFFICE VISIT (OUTPATIENT)
Dept: ORTHOPEDICS | Facility: CLINIC | Age: 28
End: 2023-10-31
Payer: COMMERCIAL

## 2023-10-31 ENCOUNTER — PRE VISIT (OUTPATIENT)
Dept: ORTHOPEDICS | Facility: CLINIC | Age: 28
End: 2023-10-31

## 2023-10-31 DIAGNOSIS — M79.A22 EXERTIONAL COMPARTMENT SYNDROME OF LEFT LOWER EXTREMITY: ICD-10-CM

## 2023-10-31 PROCEDURE — 99203 OFFICE O/P NEW LOW 30 MIN: CPT | Performed by: ORTHOPAEDIC SURGERY

## 2023-10-31 NOTE — NURSING NOTE
Reason For Visit:   Chief Complaint   Patient presents with    Consult     Exertional compartment syndrome; [facility] referral       There were no vitals taken for this visit.         Paola Coats, ATC

## 2023-10-31 NOTE — LETTER
10/31/2023         RE: Binu Sunshine  [address]  [facility] [address]        Dear Colleague,    Thank you for referring your patient, Binu Sunshine, to the [facility] [place]. Please see a copy of my visit note below.    Chief complaint: Left leg pain    Patient is a 28-year-old female who presents today for evaluation of the right lower leg.  Patient reports to have pain and discomfort with activities which will include both walking and running.  Reports to feel a sensation of tearing and tightness across the right lower leg.  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    Patient reports to have no problems with biking and to have a very predictable onset of symptoms with activities.  Patient reports that if she pushes through the pain she will have a sensation of cramping as well as numbness across the left foot.    Eventually she underwent compartment testing measurements by Dr. 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    We reviewed her past medical and surgical history current medications and drug allergies    On today's visit she presents a pleasant female in no apparent stress denies to have any constitutional symptoms    Patient with a fairly unremarkable exam with full range of motion of the left ankle.  There is a medial incision across the calf which is approximately 4 to 5 cm posterior to the posterior cortex of the tibia.  Otherwise exam is fairly unremarkable.    Assessment: Left lower leg exertional  compartment syndrome for all 4 compartments    Plan: I discussed with the patient that at this point I do believe that she presented with exertional compartment syndrome of the left lower leg for all 4 compartments.  Also discussed the most likely surgery and postoperative course improve her discomfort.  We also discussed the complications from surgery which include but not limited to infection bleeding nerve damage residual pain swelling and stiffness    All questions were answered.  The patient was pleased discussion.  Patient will schedule surgery at the base of her convenience.  In the meantime she has no restrictions.    TT 30 minutes    Sincerely,        Terry Vazquez MD

## 2023-11-01 NOTE — PROGRESS NOTES
Chief complaint: Left leg pain    Patient is a 28-year-old female who presents today for evaluation of the right lower leg.  Patient reports to have pain and discomfort with activities which will include both walking and running.  Reports to feel a sensation of tearing and tightness across the right lower leg.  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    Patient reports to have no problems with biking and to have a very predictable onset of symptoms with activities.  Patient reports that if she pushes through the pain she will have a sensation of cramping as well as numbness across the left foot.    Eventually she underwent compartment testing measurements by Dr. 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    We reviewed her past medical and surgical history current medications and drug allergies    On today's visit she presents a pleasant female in no apparent stress denies to have any constitutional symptoms    Patient with a fairly unremarkable exam with full range of motion of the left ankle.  There is a medial incision across the calf which is approximately 4 to 5 cm posterior to the posterior cortex of the tibia.  Otherwise exam is fairly unremarkable.    Assessment: Left lower leg exertional compartment syndrome for all 4 compartments    Plan: I discussed with the patient that at this point I do believe that she presented with exertional compartment syndrome of the left lower leg for all 4 compartments.  Also discussed the most likely surgery and postoperative  course improve her discomfort.  We also discussed the complications from surgery which include but not limited to infection bleeding nerve damage residual pain swelling and stiffness    All questions were answered.  The patient was pleased discussion.  Patient will schedule surgery at the base of her convenience.  In the meantime she has no restrictions.    TT 30 minutes

## 2023-11-03 ENCOUNTER — TELEPHONE (OUTPATIENT)
Dept: ORTHOPEDICS | Facility: CLINIC | Age: 28
End: 2023-11-03
Payer: COMMERCIAL

## 2023-11-03 PROBLEM — M79.A22 EXERTIONAL COMPARTMENT SYNDROME OF LEFT LOWER EXTREMITY: Status: ACTIVE | Noted: 2023-10-31

## 2023-11-03 NOTE — TELEPHONE ENCOUNTER
Patient is scheduled for surgery with Dr. Vazquez    Spoke with: Patient    Date of Surgery: 12/6/23    Location: [facility]    Post op: 2 & 6 weeks    Pre op with Provider: Complete    H&P: Scheduled with PAC 11/22/23    Additional imaging/appointments: N/A    Surgery packet: Received in clinic     Additional comments: N/A        Nia Pisano MA on 11/3/2023 at 2:38 PM

## 2023-11-06 NOTE — TELEPHONE ENCOUNTER
FUTURE VISIT INFORMATION      SURGERY INFORMATION:  Date: 12/6/23  [facility]  Surgeon:  Terry Vazquez MD   Anesthesia Type:  choice  Procedure: Left lower leg four compartment fasciotomy   Consult: ov 10/31/23    RECORDS REQUESTED FROM:       Primary Care Provider: [facility]

## 2023-11-22 ENCOUNTER — PRE VISIT (OUTPATIENT)
Dept: SURGERY | Facility: CLINIC | Age: 28
End: 2023-11-22

## 2023-11-22 ENCOUNTER — ANESTHESIA EVENT (OUTPATIENT)
Dept: SURGERY | Facility: AMBULATORY SURGERY CENTER | Age: 28
End: 2023-11-22
Payer: COMMERCIAL

## 2023-11-22 ENCOUNTER — OFFICE VISIT (OUTPATIENT)
Dept: SURGERY | Facility: CLINIC | Age: 28
End: 2023-11-22
Payer: COMMERCIAL

## 2023-11-22 VITALS
RESPIRATION RATE: 16 BRPM | SYSTOLIC BLOOD PRESSURE: 118 MMHG | OXYGEN SATURATION: 98 % | HEIGHT: 64 IN | DIASTOLIC BLOOD PRESSURE: 81 MMHG | WEIGHT: 154.3 LBS | BODY MASS INDEX: 26.34 KG/M2 | TEMPERATURE: 98.3 F | HEART RATE: 76 BPM

## 2023-11-22 DIAGNOSIS — M79.A22 EXERTIONAL COMPARTMENT SYNDROME OF LEFT LOWER EXTREMITY: ICD-10-CM

## 2023-11-22 DIAGNOSIS — Z01.818 PRE-OP EVALUATION: Primary | ICD-10-CM

## 2023-11-22 PROCEDURE — 99203 OFFICE O/P NEW LOW 30 MIN: CPT | Performed by: NURSE PRACTITIONER

## 2023-11-22 RX ORDER — SUMATRIPTAN 5 MG/1
SPRAY NASAL
COMMUNITY
Start: 2023-08-11

## 2023-11-22 ASSESSMENT — LIFESTYLE VARIABLES: TOBACCO_USE: 0

## 2023-11-22 ASSESSMENT — PAIN SCALES - GENERAL: PAINLEVEL: MODERATE PAIN (5)

## 2023-11-22 NOTE — H&P
Pre-Operative H & P     CC:  Preoperative exam to assess for increased cardiopulmonary risk while undergoing surgery and anesthesia.    Date of Encounter: 11/22/2023  Primary Care Physician:  No Ref-Primary, Physician     Reason for visit:   Encounter Diagnoses   Name Primary?    Pre-op evaluation Yes    Exertional compartment syndrome of left lower extremity        HPI  Binu Sunshine is a 28 year old female who presents for pre-operative H & P in preparation for  Procedure Information       Case: [id number] Date/Time: 12/06/23 1140    Procedure: Left lower leg four compartment fasciotomy (Left: Ankle)    Anesthesia type: Choice    Diagnosis: Exertional compartment syndrome of left lower extremity [M79.A22]    Pre-op diagnosis: Exertional compartment syndrome of left lower extremity [M79.A22]    Location: [place] [id number] / [facility]    Providers: Terry Vazquez MD          The patient presents to the PAC in-person today in preparation for the above scheduled procedure with comorbid conditions including s/p  left soleal sling release for 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 Diallo in vascular surgery.     The patient was seen by Dr. Vazquez in orthopedic surgery for evaluation of right lower leg pain. Through Dr. Vazquez's evaluation, the patient was found to have left lower leg exertional compartment syndrome for all 4 compartments.  Dr. Vazquez counseled the patient of the findings and treatment options. The patient has now been scheduled for the procedure as listed above.      History is obtained from the patient and chart review    Hx of abnormal bleeding or anti-platelet use: denies     Menstrual history: Patient's last menstrual period was 11/11/2023 (exact date).: 11/11/2023     Past Medical History  Past Medical History:   Diagnosis Date    Exertional compartment syndrome of left lower extremity     Migraine      PONV (postoperative nausea and vomiting)        Past Surgical History  Past Surgical History:   Procedure Laterality Date    RELEASE POPLITEAL ENTRAPMENT Left 11/4/2022    Procedure: Left soleus muscle release for soleal sling syndrome;  Surgeon: Flavia Diallo MD;  Location: [facility] OR       Prior to Admission Medications  Current Outpatient Medications   Medication Sig Dispense Refill    SUMAtriptan (IMITREX) 5 MG/ACT nasal spray At first sign of headache, may administer 1 spray in each nostril (2 sprays total). May repeat this once 2 hours later if needed. Maximum 2x per day.      diclofenac (VOLTAREN) 1 % topical gel Apply 2 g topically 4 times daily 100 g 0       Allergies  No Known Allergies    Social History  Social History     Socioeconomic History    Marital status: Single     Spouse name: Not on file    Number of children: Not on file    Years of education: Not on file    Highest education level: Not on file   Occupational History    Not on file   Tobacco Use    Smoking status: Never    Smokeless tobacco: Never   Substance and Sexual Activity    Alcohol use: Not Currently    Drug use: Never    Sexual activity: Not on file   Other Topics Concern    Not on file   Social History Narrative    Not on file     Social Determinants of Health     Financial Resource Strain: Not on file   Food Insecurity: Not on file   Transportation Needs: Not on file   Physical Activity: Not on file   Stress: Not on file   Social Connections: Not on file   Interpersonal Safety: Not on file   Housing Stability: Not on file       Family History  No family history on file.    Review of Systems  The complete review of systems is negative other than noted in the HPI or here.   Anesthesia Evaluation   Pt has had prior anesthetic. Type: General.    History of anesthetic complications  - PONV.      ROS/MED HX  ENT/Pulmonary:    (-) tobacco use, asthma and sleep apnea   Neurologic:     (+)      migraines (Hormonal >> tend to come every 3-4  "months.),                          Cardiovascular: Comment: Denies cardiac symptoms including chest pain, SOB, palpitations, syncope, LUDWIG, orthopnea, or PND.       (-) taking anticoagulants/antiplatelets   METS/Exercise Tolerance: >4 METS    Hematologic:  - neg hematologic  ROS     Musculoskeletal: Comment:  left lower leg exertional compartment syndrome for all 4 compartments      GI/Hepatic:  - neg GI/hepatic ROS     Renal/Genitourinary:  - neg Renal ROS     Endo:  - neg endo ROS     Psychiatric/Substance Use:  - neg psychiatric ROS     Infectious Disease:  - neg infectious disease ROS     Malignancy:  - neg malignancy ROS     Other:      (+)  LMP: 11/11/2023, , H/O Chronic Pain (left lower extremity.),         /81 (BP Location: Right arm, Patient Position: Sitting, Cuff Size: Adult Regular)   Pulse 76   Temp 98.3  F (36.8  C) (Oral)   Resp 16   Ht 1.626 m (5' 4\")   Wt 70 kg (154 lb 4.8 oz)   LMP 11/11/2023 (Exact Date)   SpO2 98%   Breastfeeding No   BMI 26.49 kg/m      Physical Exam   Constitutional: Awake, alert, cooperative, no apparent distress, and appears stated age.  Eyes: Pupils equal, round and reactive to light, extra ocular muscles intact, sclera clear, conjunctiva normal.  HENT: Normocephalic, oral pharynx with moist mucus membranes, good dentition with left upper central incisor chipped. No goiter appreciated.   Respiratory: Clear to auscultation bilaterally, no crackles or wheezing.  Cardiovascular: Regular rate and rhythm, normal S1 and S2, and no murmur noted.  Carotids +2, no bruits. No edema. Palpable pulses to radial  DP and PT arteries.   GI: Normal bowel sounds, soft, non-distended, non-tender, no masses palpated, no hepatosplenomegaly.    Lymph/Hematologic: No cervical lymphadenopathy and no supraclavicular lymphadenopathy.  Skin: Warm and dry.  No rashes at anticipated surgical site.   Musculoskeletal: Full ROM of neck. There is no redness, warmth, or swelling of the joints. " Gross motor strength is normal.    Neurologic: Awake, alert, oriented to name, place and time. Cranial nerves II-XII are grossly intact. Gait is normal.   Neuropsychiatric: Calm, cooperative. Normal affect.     Prior Labs/Diagnostic Studies   All labs and imaging personally reviewed     PANEL BASIC METABOLIC (BMP)  Specimen: Blood  Component  Ref Range & Units 6 mo ago Comments   CO2  22 - 30 mEq/L 25    Glucose  70 - 100 mg/dL 107 High     BUN  6 - 20 mg/dL 12    Creatinine  0.50 - 1.00 mg/dL 0.80    Calcium  8.6 - 10.0 mg/dL 9.6    Sodium  135 - 148 mEq/L 137    Potassium  3.5 - 5.3 mEq/L 4.6    Chloride  92 - 108 mEq/L 104    AnGap  8 - 16 mEq/L 8    eGFR, High  >=60 ml/min/1.73m2 116 Calculated using CKD-EPI equation   eGFR, Low  >=60 ml/min/1.73m2 101 Calculated using CKD-EPI equation   [facility]ing Agency [facility]    Specimen Collected: 05/05/23  8:35 AM    Performed by: [facility]          CBC WITH PLATELET  Specimen: Blood  Component  Ref Range & Units 6 mo ago   WBC  4.00 - 10.00 k/cmm 7.71   RBC  3.90 - 5.20 m/cmm 4.42   Hgb  11.5 - 15.7 g/dL 12.9   Hematocrit  34.0 - 45.0 % 39.5   MCV  80.0 - 100.0 fL 89.4   MCH  25.0 - 32.0 pg 29.2   MCHC  31.0 - 36.0 g/dL 32.7   RDW  11.5 - 14.5 % 13.4   Plt  150 - 400 k/cmm 223   MPV  6.5 - 12.5 fL 11.8   [facility]ing Agency [facility]   Specimen Collected: 05/05/23  8:35 AM    Performed by: [facility]          GLYCOSYLATED HGB -  A1C  Specimen: Blood  Component  Ref Range & Units 3 mo ago Comments   Hemoglobin A1C  4.0 - 5.6 % 5.5      PROCEDURES  MR left tibia/fibula without contrast 6/23/2023 8:33 AM     History: pt w/ persistent calf weakness s/p soleal sling release for  tibial nerve compression; Popliteal artery entrapment syndrome (H);  Tibial nerve lesion; Calf muscle weakness     Comparison: Ultrasound 11/16/2022, MR 8/8/2022                                                                    Impression:  1. At the level and proximal to the external marker,  interim  postsurgical change with partial resection of soleus muscle. No  regional postoperative fluid collection. No change in caliber or  abnormal T2 hyperintensity of the tibial nerve.  2. Mild progression fatty infiltration of soleus muscle, especially  medial part proximally.   3. Increased areas of nonspecific subcutaneous edema.      QUINTIN PRADO   The patient's records and results personally reviewed by this provider.     Outside records reviewed from: Care Everywhere    LAB/DIAGNOSTIC STUDIES TODAY:  not indicated     Assessment    Binu Sunshine is a 28 year old female seen as a PAC referral for risk assessment and optimization for anesthesia.    Plan/Recommendations  Pt will be optimized for the proposed procedure.  See below for details on the assessment, risk, and preoperative recommendations    NEUROLOGY  - No history of TIA, CVA or seizure    - migraines typically hormonal occurring once every 3-4 months   ~ hold sumatriptan DOS    -Post Op delirium risk factors:  No risk identified    ENT  - No current airway concerns.  Will need to be reassessed day of surgery.  Upper left central incisor chipped  Mallampati: I  TM: > 3    CARDIAC  - No history of CAD, Hypertension, and Afib    - METS (Metabolic Equivalents)  Patient performs 4 or more METS exercise without symptoms            Total Score: 0      - RCRI-Very low risk: Class 1 0.4% complication rate            Total Score: 0        PULMONARY  - STEFANO Low Risk            Total Score: 0      - Denies asthma or inhaler use    - Tobacco History    History   Smoking Status    Never   Smokeless Tobacco    Never       GI  - PONV.   (+) Significant history. Final decisions regarding prophylaxis by Anesthesia on DOS   PONV High Risk  Total Score: 4           1 AN PONV: Pt is Female    1 AN PONV: Patient is not a current smoker    1 AN PONV: Patient has history of PONV    1 AN PONV: Intended Post Op Opioids        /RENAL  - Baseline Creatinine  see  "above     ENDOCRINE    - BMI: Estimated body mass index is 26.49 kg/m  as calculated from the following:    Height as of this encounter: 1.626 m (5' 4\").    Weight as of this encounter: 70 kg (154 lb 4.8 oz).  Overweight (BMI 25.0-29.9)    - No history of Diabetes Mellitus    HEME  VTE Low Risk 0.26%            Total Score: 0      - No history of abnormal bleeding or antiplatelet use.      MSK  -  left lower leg exertional compartment syndrome for all 4 compartments   ~ above procedure scheduled     Different anesthesia methods/types have been discussed with the patient, but they are aware that the final plan will be decided by the assigned anesthesia provider on the date of service.      The patient is optimized for their procedure. AVS with information on surgery time/arrival time, meds and NPO status given by nursing staff. No further diagnostic testing indicated.      On the day of service:     Prep time: 12 minutes  Visit time: 12 minutes  Documentation time: 8 minutes  ------------------------------------------  Total time: 32 minutes      SEREAN Cameron CNP  Preoperative Assessment Center  [facility]  Clinic and Surgery Center  Phone: [phone]  Fax: [phone]    "

## 2023-11-22 NOTE — PATIENT INSTRUCTIONS
Preparing for Your Surgery      Name:  Binu Sunshine   MRN:  [id number]   :  1995   Today's Date:  2023         Arriving for surgery:  Surgery date:  23  Arrival time:  10.10AM    Restrictions due to COVID 19:    Please maintain social distance.  Masking is optional.      parking is available for anyone with mobility limitations or disabilities. (Monday- Friday 7 am- 5 pm)    Please come to:    [facility]  [address] [place] [id number]    Please check in on the 5th floor at the Ambulatory Surgery Center.      What can I eat or drink?    -  You may eat and drink normally until 8 hours prior to arrival  time. (Until 2.10AM)  -  You may have clear liquids until 2 hours prior to arrival  time. (Until 8.10AM)    Examples of clear liquids:  Water  Clear broth  Juices (apple, white grape, white cranberry  and cider) without pulp  Noncarbonated, powder based beverages  (lemonade and Bhavin-Aid)  Sodas (Sprite, 7-Up, ginger ale and seltzer)  Coffee or tea (without milk or cream)  Gatorade      Which medicines can I take?    Hold Aspirin for 7 days before surgery.   Hold Multivitamins for 7 days before surgery.  Hold Supplements for 7 days before surgery.  Hold Ibuprofen (Advil, Motrin) for 1 day before surgery--unless otherwise directed by surgeon.  Hold Naproxen (Aleve) for 4 days before surgery.    No alcohol or cannabis products for 24 hours prior to procedure.      -  DO NOT take the following medications the day of surgery:  Sumatriptan (Imitrex)    -  PLEASE TAKE the following medications the day of surgery:   None     How do I prepare myself?  - Please take 2 showers (one the night prior to surgery and one the morning of surgery) using Scrubcare or Hibiclens soap.    Use this soap only from the neck to your toes.     Leave the soap on your skin for one minute--then rinse thoroughly.      You may use your own shampoo and conditioner. No other hair  products.   - Please remove all jewelry and body piercings.  - No lotions, deodorants or fragrance.  - No makeup or fingernail polish.   - Bring your ID and insurance card.    -If you have a Deep Brain Stimulator, a Spinal Cord Stimulator, or any implanted Neuro Device, you must bring the remote to the Surgery Center.         ALL PATIENTS ARE REQUIRED TO HAVE A RESPONSIBLE ADULT TO DRIVE AND BE IN ATTENDANCE WITH THEM FOR 24 HOURS FOLLOWING SURGERY.     Covid testing policy as of 12/06/2022  Your surgeon will notify and schedule you for a COVID test if one is needed before surgery--please direct any questions or COVID symptoms to your surgeon      Questions or Concerns:    -For questions regarding the day of surgery, please contact the Ambulatory Surgery Center at [phone].    -If you have health changes between today and your surgery, please contact your surgeon.     - For questions after surgery, please contact your surgeon's office.

## 2023-11-30 ENCOUNTER — TELEPHONE (OUTPATIENT)
Dept: ORTHOPEDICS | Facility: CLINIC | Age: 28
End: 2023-11-30
Payer: COMMERCIAL

## 2023-11-30 NOTE — TELEPHONE ENCOUNTER
A call was placed to the patient and pre-op teaching was performed over the phone.    Teaching Flowsheet   Relevant Diagnosis: Pre-Op Teaching  Teaching Topic:      Person(s) involved in teaching:   Patient     Motivation Level:  Asks Questions: Yes  Eager to Learn: Yes  Cooperative: Yes  Receptive (willing/able to accept information): Yes  Any cultural factors/Anabaptist beliefs that may influence understanding or compliance? No     Patient demonstrates understanding of the following:  Reason for the appointment, diagnosis and treatment plan: Yes  Knowledge of proper use of medications and conditions for which they are ordered (with special attention to potential side effects or drug interactions): Yes  Which situations necessitate calling provider and whom to contact: Yes- discussed the stoplight tool to help assist with this.      Teaching Concerns Addressed:      Proper use of surgical scrub explain: Yes    Nutritional needs and diet plan: Yes  Pain management techniques: Yes  Wound Care: Yes  How and/when to access community resources: Yes     Instructional Materials Used/Given:  2 bottle of chlorhexidine and a surgery packet given to patient in clinic.      - Important contact info/ phone numbers: emphasizing clinic number and after hours number  - Map/ location of surgery  - Medications to avoid  - Showering instructions  - Stop light tool     Additionally the following was discussed with patient:  - Fiance will be driving the patient to surgery and staying with them for 24 hours.      -Next step: PAC completed 11/22 and Surgery date: 12/6    Time spent with patient: 15 minutes.

## 2023-12-05 ENCOUNTER — TELEPHONE (OUTPATIENT)
Dept: ORTHOPEDICS | Facility: CLINIC | Age: 28
End: 2023-12-05
Payer: COMMERCIAL

## 2023-12-05 NOTE — TELEPHONE ENCOUNTER
Health Call Center    Phone Message    May a detailed message be left on voicemail: yes     Reason for Call: Other: Per pt she has surgery schedule tm 12/06/23 with Dr. Vazquez. She states she is currently on her monthly period and would like to know if there is any prep or concern she should know before surgery. Please call pt back at [phone]     Action Taken: Other: [facility] [[id number]]    Travel Screening: Not Applicable

## 2023-12-05 NOTE — CONFIDENTIAL NOTE
Returned call to patient. Informed there is no specific requirements needed. Patient appreciated callback.    Tara Holter, RNCC

## 2023-12-06 ENCOUNTER — ANESTHESIA (OUTPATIENT)
Dept: SURGERY | Facility: AMBULATORY SURGERY CENTER | Age: 28
End: 2023-12-06
Payer: COMMERCIAL

## 2023-12-06 ENCOUNTER — HOSPITAL ENCOUNTER (OUTPATIENT)
Facility: AMBULATORY SURGERY CENTER | Age: 28
Discharge: HOME | End: 2023-12-06
Attending: ORTHOPAEDIC SURGERY
Payer: COMMERCIAL

## 2023-12-06 VITALS
SYSTOLIC BLOOD PRESSURE: 115 MMHG | RESPIRATION RATE: 14 BRPM | BODY MASS INDEX: 26.29 KG/M2 | TEMPERATURE: 97.4 F | WEIGHT: 154 LBS | HEART RATE: 65 BPM | OXYGEN SATURATION: 98 % | HEIGHT: 64 IN | DIASTOLIC BLOOD PRESSURE: 73 MMHG

## 2023-12-06 DIAGNOSIS — M79.A22 EXERTIONAL COMPARTMENT SYNDROME OF LEFT LOWER EXTREMITY: Primary | ICD-10-CM

## 2023-12-06 LAB
HCG UR QL: NEGATIVE
INTERNAL QC OK POCT: NORMAL
POCT KIT EXPIRATION DATE: NORMAL
POCT KIT LOT NUMBER: NORMAL

## 2023-12-06 PROCEDURE — 27602 DECOMPRESSION OF LOWER LEG: CPT | Mod: LT

## 2023-12-06 PROCEDURE — 81025 URINE PREGNANCY TEST: CPT | Performed by: PATHOLOGY

## 2023-12-06 RX ORDER — HYDROCODONE BITARTRATE AND ACETAMINOPHEN 5; 325 MG/1; MG/1
1-2 TABLET ORAL EVERY 4 HOURS PRN
Qty: 15 TABLET | Refills: 0 | Status: SHIPPED | OUTPATIENT
Start: 2023-12-06

## 2023-12-06 RX ORDER — CEFAZOLIN SODIUM 2 G/50ML
2 SOLUTION INTRAVENOUS
Status: COMPLETED | OUTPATIENT
Start: 2023-12-06 | End: 2023-12-06

## 2023-12-06 RX ORDER — CEFAZOLIN SODIUM 2 G/50ML
2 SOLUTION INTRAVENOUS SEE ADMIN INSTRUCTIONS
Status: DISCONTINUED | OUTPATIENT
Start: 2023-12-06 | End: 2023-12-06 | Stop reason: HOSPADM

## 2023-12-06 RX ORDER — HYDROCODONE BITARTRATE AND ACETAMINOPHEN 5; 325 MG/1; MG/1
1 TABLET ORAL
Status: DISCONTINUED | OUTPATIENT
Start: 2023-12-06 | End: 2023-12-07 | Stop reason: HOSPADM

## 2023-12-06 RX ORDER — HYDROMORPHONE HYDROCHLORIDE 1 MG/ML
0.4 INJECTION, SOLUTION INTRAMUSCULAR; INTRAVENOUS; SUBCUTANEOUS EVERY 5 MIN PRN
Status: DISCONTINUED | OUTPATIENT
Start: 2023-12-06 | End: 2023-12-06 | Stop reason: HOSPADM

## 2023-12-06 RX ORDER — PROPOFOL 10 MG/ML
INJECTION, EMULSION INTRAVENOUS PRN
Status: DISCONTINUED | OUTPATIENT
Start: 2023-12-06 | End: 2023-12-06

## 2023-12-06 RX ORDER — ONDANSETRON 4 MG/1
4 TABLET, ORALLY DISINTEGRATING ORAL EVERY 30 MIN PRN
Status: DISCONTINUED | OUTPATIENT
Start: 2023-12-06 | End: 2023-12-06 | Stop reason: HOSPADM

## 2023-12-06 RX ORDER — ACETAMINOPHEN 325 MG/1
975 TABLET ORAL ONCE
Status: DISCONTINUED | OUTPATIENT
Start: 2023-12-06 | End: 2023-12-06 | Stop reason: HOSPADM

## 2023-12-06 RX ORDER — ONDANSETRON 4 MG/1
4 TABLET, ORALLY DISINTEGRATING ORAL EVERY 30 MIN PRN
Status: DISCONTINUED | OUTPATIENT
Start: 2023-12-06 | End: 2023-12-07 | Stop reason: HOSPADM

## 2023-12-06 RX ORDER — SODIUM CHLORIDE, SODIUM LACTATE, POTASSIUM CHLORIDE, CALCIUM CHLORIDE 600; 310; 30; 20 MG/100ML; MG/100ML; MG/100ML; MG/100ML
INJECTION, SOLUTION INTRAVENOUS CONTINUOUS
Status: DISCONTINUED | OUTPATIENT
Start: 2023-12-06 | End: 2023-12-06 | Stop reason: HOSPADM

## 2023-12-06 RX ORDER — ONDANSETRON 2 MG/ML
4 INJECTION INTRAMUSCULAR; INTRAVENOUS EVERY 30 MIN PRN
Status: DISCONTINUED | OUTPATIENT
Start: 2023-12-06 | End: 2023-12-06 | Stop reason: HOSPADM

## 2023-12-06 RX ORDER — PROPOFOL 10 MG/ML
INJECTION, EMULSION INTRAVENOUS CONTINUOUS PRN
Status: DISCONTINUED | OUTPATIENT
Start: 2023-12-06 | End: 2023-12-06

## 2023-12-06 RX ORDER — BUPIVACAINE HYDROCHLORIDE 5 MG/ML
INJECTION, SOLUTION PERINEURAL PRN
Status: DISCONTINUED | OUTPATIENT
Start: 2023-12-06 | End: 2023-12-06 | Stop reason: HOSPADM

## 2023-12-06 RX ORDER — LIDOCAINE 40 MG/G
CREAM TOPICAL
Status: DISCONTINUED | OUTPATIENT
Start: 2023-12-06 | End: 2023-12-06 | Stop reason: HOSPADM

## 2023-12-06 RX ORDER — FENTANYL CITRATE 50 UG/ML
25 INJECTION, SOLUTION INTRAMUSCULAR; INTRAVENOUS EVERY 5 MIN PRN
Status: DISCONTINUED | OUTPATIENT
Start: 2023-12-06 | End: 2023-12-06 | Stop reason: HOSPADM

## 2023-12-06 RX ORDER — DEXAMETHASONE SODIUM PHOSPHATE 4 MG/ML
INJECTION, SOLUTION INTRA-ARTICULAR; INTRALESIONAL; INTRAMUSCULAR; INTRAVENOUS; SOFT TISSUE PRN
Status: DISCONTINUED | OUTPATIENT
Start: 2023-12-06 | End: 2023-12-06

## 2023-12-06 RX ORDER — HYDROXYZINE HYDROCHLORIDE 25 MG/1
25 TABLET, FILM COATED ORAL
Status: COMPLETED | OUTPATIENT
Start: 2023-12-06 | End: 2023-12-06

## 2023-12-06 RX ORDER — ONDANSETRON 2 MG/ML
4 INJECTION INTRAMUSCULAR; INTRAVENOUS EVERY 30 MIN PRN
Status: DISCONTINUED | OUTPATIENT
Start: 2023-12-06 | End: 2023-12-07 | Stop reason: HOSPADM

## 2023-12-06 RX ORDER — OXYCODONE HYDROCHLORIDE 5 MG/1
5 TABLET ORAL
Status: COMPLETED | OUTPATIENT
Start: 2023-12-06 | End: 2023-12-06

## 2023-12-06 RX ORDER — FENTANYL CITRATE 50 UG/ML
50 INJECTION, SOLUTION INTRAMUSCULAR; INTRAVENOUS EVERY 5 MIN PRN
Status: DISCONTINUED | OUTPATIENT
Start: 2023-12-06 | End: 2023-12-06 | Stop reason: HOSPADM

## 2023-12-06 RX ORDER — LABETALOL HYDROCHLORIDE 5 MG/ML
10 INJECTION, SOLUTION INTRAVENOUS
Status: DISCONTINUED | OUTPATIENT
Start: 2023-12-06 | End: 2023-12-06 | Stop reason: HOSPADM

## 2023-12-06 RX ORDER — LIDOCAINE HYDROCHLORIDE 20 MG/ML
INJECTION, SOLUTION INFILTRATION; PERINEURAL PRN
Status: DISCONTINUED | OUTPATIENT
Start: 2023-12-06 | End: 2023-12-06

## 2023-12-06 RX ORDER — OXYCODONE HYDROCHLORIDE 5 MG/1
10 TABLET ORAL
Status: DISCONTINUED | OUTPATIENT
Start: 2023-12-06 | End: 2023-12-07 | Stop reason: HOSPADM

## 2023-12-06 RX ORDER — HYDROMORPHONE HYDROCHLORIDE 1 MG/ML
0.2 INJECTION, SOLUTION INTRAMUSCULAR; INTRAVENOUS; SUBCUTANEOUS EVERY 5 MIN PRN
Status: DISCONTINUED | OUTPATIENT
Start: 2023-12-06 | End: 2023-12-06 | Stop reason: HOSPADM

## 2023-12-06 RX ORDER — HYDROMORPHONE HYDROCHLORIDE 1 MG/ML
0.5 INJECTION, SOLUTION INTRAMUSCULAR; INTRAVENOUS; SUBCUTANEOUS ONCE
Status: COMPLETED | OUTPATIENT
Start: 2023-12-06 | End: 2023-12-06

## 2023-12-06 RX ORDER — FENTANYL CITRATE 50 UG/ML
INJECTION, SOLUTION INTRAMUSCULAR; INTRAVENOUS PRN
Status: DISCONTINUED | OUTPATIENT
Start: 2023-12-06 | End: 2023-12-06

## 2023-12-06 RX ORDER — HYDROXYZINE HYDROCHLORIDE 25 MG/1
25 TABLET, FILM COATED ORAL 3 TIMES DAILY PRN
Qty: 15 TABLET | Refills: 0 | Status: SHIPPED | OUTPATIENT
Start: 2023-12-06

## 2023-12-06 RX ORDER — ONDANSETRON 2 MG/ML
INJECTION INTRAMUSCULAR; INTRAVENOUS PRN
Status: DISCONTINUED | OUTPATIENT
Start: 2023-12-06 | End: 2023-12-06

## 2023-12-06 RX ADMIN — HYDROXYZINE HYDROCHLORIDE 25 MG: 25 TABLET, FILM COATED ORAL at 11:16

## 2023-12-06 RX ADMIN — FENTANYL CITRATE 50 MCG: 50 INJECTION, SOLUTION INTRAMUSCULAR; INTRAVENOUS at 10:29

## 2023-12-06 RX ADMIN — ONDANSETRON 4 MG: 2 INJECTION INTRAMUSCULAR; INTRAVENOUS at 09:02

## 2023-12-06 RX ADMIN — PROPOFOL 200 MG: 10 INJECTION, EMULSION INTRAVENOUS at 09:00

## 2023-12-06 RX ADMIN — FENTANYL CITRATE 50 MCG: 50 INJECTION, SOLUTION INTRAMUSCULAR; INTRAVENOUS at 09:10

## 2023-12-06 RX ADMIN — FENTANYL CITRATE 25 MCG: 50 INJECTION, SOLUTION INTRAMUSCULAR; INTRAVENOUS at 10:19

## 2023-12-06 RX ADMIN — PROPOFOL 150 MCG/KG/MIN: 10 INJECTION, EMULSION INTRAVENOUS at 09:00

## 2023-12-06 RX ADMIN — FENTANYL CITRATE 25 MCG: 50 INJECTION, SOLUTION INTRAMUSCULAR; INTRAVENOUS at 10:25

## 2023-12-06 RX ADMIN — SODIUM CHLORIDE, SODIUM LACTATE, POTASSIUM CHLORIDE, CALCIUM CHLORIDE: 600; 310; 30; 20 INJECTION, SOLUTION INTRAVENOUS at 08:57

## 2023-12-06 RX ADMIN — ONDANSETRON 4 MG: 2 INJECTION INTRAMUSCULAR; INTRAVENOUS at 10:09

## 2023-12-06 RX ADMIN — DEXAMETHASONE SODIUM PHOSPHATE 4 MG: 4 INJECTION, SOLUTION INTRA-ARTICULAR; INTRALESIONAL; INTRAMUSCULAR; INTRAVENOUS; SOFT TISSUE at 09:02

## 2023-12-06 RX ADMIN — FENTANYL CITRATE 50 MCG: 50 INJECTION, SOLUTION INTRAMUSCULAR; INTRAVENOUS at 09:00

## 2023-12-06 RX ADMIN — HYDROMORPHONE HYDROCHLORIDE 0.5 MG: 1 INJECTION, SOLUTION INTRAMUSCULAR; INTRAVENOUS; SUBCUTANEOUS at 11:52

## 2023-12-06 RX ADMIN — OXYCODONE HYDROCHLORIDE 5 MG: 5 TABLET ORAL at 10:31

## 2023-12-06 RX ADMIN — CEFAZOLIN SODIUM 2 G: 2 SOLUTION INTRAVENOUS at 08:55

## 2023-12-06 RX ADMIN — LIDOCAINE HYDROCHLORIDE 100 MG: 20 INJECTION, SOLUTION INFILTRATION; PERINEURAL at 09:00

## 2023-12-06 NOTE — ANESTHESIA PROCEDURE NOTES
Airway         Procedure Start/Stop Times: 12/6/2023 9:01 AM  Staff -        Performed By: CRNAIndications and Patient Condition       Indications for airway management: airway protection       Induction type:intravenous       Mask difficulty assessment: 0 - not attempted    Final Airway Details       Final airway type: supraglottic airway    Supraglottic Airway Details        Type: LMA       Brand: LMA Unique       LMA size: 4    Post intubation assessment        Placement verified by: capnometry, equal breath sounds and chest rise        Number of attempts at approach: 1       Number of other approaches attempted: 0       Secured with: tape       Ease of procedure: easy       Dentition: Intact    Medication(s) Administered   Medication Administration Time: 12/6/2023 9:01 AM

## 2023-12-06 NOTE — ANESTHESIA PREPROCEDURE EVALUATION
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;  Surgeon: Flavia Diallo MD;  Location:  OR      No Known Allergies   Social History     Tobacco Use    Smoking status: Never    Smokeless tobacco: Never   Substance Use Topics    Alcohol use: Not Currently      Wt Readings from Last 1 Encounters:   23 70 kg (154 lb 4.8 oz)        Anesthesia Evaluation            ROS/MED HX  ENT/Pulmonary:  - neg pulmonary ROS     Neurologic:  - neg neurologic ROS     Cardiovascular:  - neg cardiovascular ROS     METS/Exercise Tolerance: >4 METS    Hematologic:  - neg hematologic  ROS     Musculoskeletal:  - neg musculoskeletal ROS     GI/Hepatic:  - neg GI/hepatic ROS     Renal/Genitourinary:  - neg Renal ROS     Endo:  - neg endo ROS     Psychiatric/Substance Use:  - neg psychiatric ROS     Infectious Disease:  - neg infectious disease ROS     Malignancy:  - neg malignancy ROS     Other:  - neg other ROS          Physical Exam    Airway        Mallampati: II   TM distance: > 3 FB   Neck ROM: full     Respiratory Devices and Support         Dental       (+) Completely normal teeth      Cardiovascular          Rhythm and rate: regular     Pulmonary           breath sounds clear to auscultation           OUTSIDE LABS:  CBC:   Lab Results   Component Value Date    WBC 6.5 2022    HGB 12.2 2022    HCT 36.6 2022     2022     BMP:   Lab Results   Component Value Date    GLC 94 2022     COAGS: No results found for: \"PTT\", \"INR\", " "\"FIBR\"  POC: No results found for: \"BGM\", \"HCG\", \"HCGS\"  HEPATIC: No results found for: \"ALBUMIN\", \"PROTTOTAL\", \"ALT\", \"AST\", \"GGT\", \"ALKPHOS\", \"BILITOTAL\", \"BILIDIRECT\", \"LIANG\"  OTHER: No results found for: \"PH\", \"LACT\", \"A1C\", \"ROSA MARIA\", \"PHOS\", \"MAG\", \"LIPASE\", \"AMYLASE\", \"TSH\", \"T4\", \"T3\", \"CRP\", \"SED\"    Anesthesia Plan    ASA Status:  1       Anesthesia Type: General.     - Airway: LMA   Induction: Propofol.   Maintenance: TIVA.        Consents    Anesthesia Plan(s) and associated risks, benefits, and realistic alternatives discussed. Questions answered and patient/representative(s) expressed understanding.     - Discussed: Risks, Benefits and Alternatives for BOTH SEDATION and the PROCEDURE were discussed     - Discussed with:  Patient            Postoperative Care    Pain management: Multi-modal analgesia.   PONV prophylaxis: Ondansetron (or other 5HT-3), Dexamethasone or Solumedrol, Background Propofol Infusion     Comments:               Sd Last MD    I have reviewed the pertinent notes and labs in the chart from the past 30 days and (re)examined the patient.  Any updates or changes from those notes are reflected in this note.              # Overweight: Estimated body mass index is 26.49 kg/m  as calculated from the following:    Height as of 11/22/23: 1.626 m (5' 4\").    Weight as of 11/22/23: 70 kg (154 lb 4.8 oz).      "

## 2023-12-06 NOTE — OP NOTE
Date of surgery: 12/6/2023      Preoperative diagnosis: Left lower leg 4 compartment exertional compartment syndrome      Postoperative diagnosis:  Left lower leg 4 compartment exertional compartment syndrome     Procedure: Left lower leg anterior, lateral, deep posterior, and superficial posterior compartment fasciotomy     Surgeons: Terry Vazquez MD     Assistants: Jaime Corral PA-C     Complications: None     Drains: None     EBL: Less than 20 cc     Anesthesia: General endotracheal     Indications: Please refer to clinic note for further details     Procedure note: On 12/6/2023 patient was taken to surgery.  Preoperative antibiotics were administered to the patient prior to arrival to the OR     After successful induction of general endotracheal anesthesia she  was placed supine on the table.  The left lower extremity was prepped and draped in sterile fashion.  After exsanguination by gravity, tourniquet cuff was inflated to 300 mmHg on the proximal third of the left thigh.      The pause for the cause was performed according to our institutional policy which confirmed laterality of the procedure.     An incision was made on the medial border of the medial cortex of the tibia.  Subcutaneous tissues were dissected.  Proceed with identification of the fascia and a fasciotomy was performed for the posterior deep compartment.  Patient presented with a fairly atrophic deep posterior compartment almost with signs compatible with some sort of muscular muscular dystrophy.  The posterior superficial compartment present presented to a healthier appearance and a fasciotomy was performed at that level as well.  With regards to the posterior deep compartment special attention was placed identify the posterior tibialis tendon muscle belly and confirm that it did not have an independent fascia at that level.    Two incisions were made along the proximal and distal third of the lower leg anterolaterally respectively.  Through  these incisions we presented to creation of the fascia for anterior lateral compartments and a complete fasciotomy was performed.  The specialties was placed to avoid any potential damage to the dorsal cutaneous branch of the superficial peroneal nerve distally.    We confirmed through digital exploration the fact of having a complete release for all 4 compartments.     Tourniquet was deflated.  Satisfactory hemostasis was accomplished.  Wound was closed in layers.  Sterile dressings were applied.  Patient was placed in a tall cam walker and transferred in stable condition to PACU.      Plan: Patient will remain weightbearing as tolerated.  Patient will utilize a cam walker at all times except for hygiene for the first 2 weeks and surgery at that time sutures were removed indicated and she will proceed with the use of the cam walker for comfort purposes    Patient will pursue physical therapy with restrictions between weeks 2 and 6 with an emphasis on exercises in order to expand her muscles up accordingly.    Patient will be reevaluated at 6 weeks from surgery and at that time no x-rays will be obtained.    Terry Vazquez MD

## 2023-12-06 NOTE — ANESTHESIA POSTPROCEDURE EVALUATION
Patient: Binu Sunshine    Procedure: Procedure(s):  Left lower leg four compartment fasciotomy       Anesthesia Type:  General    Note:  Disposition: Outpatient   Postop Pain Control: Uneventful            Sign Out: Well controlled pain   PONV: No   Neuro/Psych: Uneventful            Sign Out: Acceptable/Baseline neuro status   Airway/Respiratory: Uneventful            Sign Out: Acceptable/Baseline resp. status   CV/Hemodynamics: Uneventful            Sign Out: Acceptable CV status; No obvious hypovolemia; No obvious fluid overload   Other NRE: NONE   DID A NON-ROUTINE EVENT OCCUR?            Last vitals:  Vitals Value Taken Time   /86 12/06/23 1030   Temp 36.6  C (97.8  F) 12/06/23 0957   Pulse 79 12/06/23 1033   Resp 0 12/06/23 1033   SpO2 100 % 12/06/23 1033   Vitals shown include unfiled device data.    Electronically Signed By: Sd Last MD  December 6, 2023  10:34 AM

## 2023-12-06 NOTE — PROGRESS NOTES
In recovery patient reports new complaints of cramping in left calf and numbness of 4th toe on the left foot which is worse compared to preop. Dr. Vazquez informed and he came to bedside to assess. He reports no concerns and recommended giving hydroxizine for cramping.Dr. Vazquez indicates it is okay for patient to take ibuprofen for pain after discharging.  Patient continues to have 9/10 left calf pain -( sharp)following oxycodone and hydroxizine. Dr. Last with anesthesia informed and he recommends up to 1 mg IV dilaudid. Writer gave 0.5 mg IV dilaudid and patient's pain decreased to 3/10 in the left calf. Patient states pain is now tolerable.

## 2023-12-06 NOTE — DISCHARGE INSTRUCTIONS
"[facility]  Home Care Following Anesthesia  For 24 hours after surgery:  Get plenty of rest.  A responsible adult must stay with you for at least 24 hours after you leave the surgery center.  Do not drive or use heavy equipment.  If you have weakness or tingling, don't drive or use heavy equipment until this feeling goes away.   Do not drink alcohol.   Avoid strenuous or risky activities.  Ask for help when climbing stairs.  You may feel lightheaded.  IF so, sit for a few minutes before standing.  Have someone help you get up.   If you have nausea (feel sick to your stomach): Drink only clear liquids such as apple juice, ginger ale, broth or 7-Up.  Rest may also help.  Be sure to drink enough fluids.  Move to a regular diet as you feel able.   You may have a slight fever.  Call the doctor if your fever is over 100 F (37.7 C) (taken under the tongue) or lasts longer than 24 hours.  You may have a dry mouth, a sore throat, muscle aches or trouble sleeping. These should go away after 24 hours.  Do not make important or legal decisions.   It is recommended to avoid smoking.        Today you received a Marcaine or bupivacaine block to numb the nerves near your surgery site.  This is a block using local anesthetic or \"numbing\" medication injected around the nerves to anesthetize or \"numb\" the area supplied by those nerves.  This block is injected into the muscle layer near your surgical site.  The medication may numb the location where you had surgery for 6-18 hours, but may last up to 24 hours.  If your surgical site is an arm or leg you should be careful with your affected limb, since it is possible to injure your limb without being aware of it due to the numbing.  Until full feeling returns, you should guard against bumping or hitting your limb, and avoid extreme hot or cold temperatures on the skin.  As the block wears off, the feeling will return as a tingling or prickly " sensation near your surgical site.  You will experience more discomfort from your incision as the feeling returns.  You may want to take a pain pill (a narcotic or Tylenol if this was prescribed by your surgeon) when you start to experience mild pain before the pain beccomes more severe.  If your pain medications do not control your pain you should notifiy your surgeon.    Tips for taking pain medications  To get the best pain relief possible, remember these points:  Take pain medications as directed, before pain becomes severe.  Pain medication can upset your stomach: taking it with food may help.  Constipation is a common side effect of pain medication. Drink plenty of  fluids.  Eat foods high in fiber. Take a stool softener if recommended by your doctor or pharmacist.  Do not drink alcohol, drive or operate machinery while taking pain medications.  Ask about other ways to control pain, such as with heat, ice or relaxation.    Tylenol/Acetaminophen Consumption    If you feel your pain relief is insufficient, you may take Tylenol/Acetaminophen in addition to your narcotic pain medication.   Be careful not to exceed 4,000 mg of Tylenol/Acetaminophen in a 24 hour period from all sources.  If you are taking extra strength Tylenol/acetaminophen (500 mg), the maximum dose is 8 tablets in 24 hours.  If you are taking regular strength acetaminophen (325 mg), the maximum dose is 12 tablets in 24 hours.    Call a doctor for any of the following:  Signs of infection (fever, growing tenderness at the surgery site, a large amount of drainage or bleeding, severe pain, foul-smelling drainage, redness, swelling).  It has been over 8 to 10 hours since surgery and you are still not able to urinate (pass water).  Headache for over 24 hours.  Numbness, tingling or weakness the day after surgery (if you had spinal anesthesia).  Signs of Covid-19 infection (temperature over 100 degrees, shortness of breath, cough, loss of taste/smell,  "generalized body aches, persistent headache, chills, sore throat, nausea/vomiting/diarrhea)  Your doctor is:       Dr. Terry Vazquez, Orthopaedics: [phone]               Or dial [phone] and ask for the resident on call for:  Orthopaedics  For emergency care, call the:  [facility] [place] [facility]: [phone] (TTY for hearing impaired: [phone])        Safety Tips for Using Crutches    Crutch Fit:  Assume good standing posture with shoulders relaxed and crutch tips 6-8 inches out from the side of the foot.  The underarm pad should fall 2-3 fingers width below the armpit.  The handgrip is positioned level with the wrist to allow 30  flexion at the elbow.    Safety Tips:  Bear weight on your hands, not on your armpits.  Do not add extra padding to the underarm pad. This will, in effect, lengthen the crutches and increase risk of nerve injury.  Wear flat, properly fitting shoes. Do not walk in stocking feet, high heels or slippers.  Household hazards:  --Throw rugs should be removed from floors.  --Stairs should be cleared of obstacles.  --Use extra caution on slippery, highly polished, littered or uneven floor surfaces.  --Check for electric cords.  Check crutch tips for excessive wear and keep wing nuts tight.  While walking, look forward with  head up  and  eyes open.  Take equal length steps.  Use BOTH crutches.    Stairs Sequence:  UP: \"Good\" leg first, followed by  bad  leg, then crutches.  DOWN: Crutches, followed by  bad  leg, \"good\" leg.     Walking with Crutches:  Move both crutches forward at the same time.  Non-Weight Bearing (NWB):  Hold the involved leg up and swing through the crutches with the involved leg. The involved leg does not touch the floor.  Toe Touch Weight Bearing (TTWB): Move the involved leg forward. Rest it lightly on the floor for balance only. Step through the crutches with the uninvolved leg.  Partial Weight Bearing (PWB): Move the involved leg forward. Step down the " weight of the leg only.  Step through the crutches with the uninvolved leg.  Weight Bearing As Tolerated (WBAT): Move the involved leg forward. Put as much pressure through the involved leg as you can tolerate comfortably. Then step through the crutches with the uninvolved leg.

## 2023-12-06 NOTE — PROGRESS NOTES
Dr. Last with anesthesia informed pt having nausea and has already received at total of 8 mg IV zofran. Order received to admin 10 mg IV Barhemsy's. Kaity, pre/post RN administered medication and patient reported relief of nausea prior to discharge.

## 2023-12-06 NOTE — BRIEF OP NOTE
[facility] Brief Operative Note    Pre-operative diagnosis: Exertional compartment syndrome of left lower extremity [M79.A22]   Post-operative diagnosis Exertional compartment syndrome of left lower extremity   Procedure: Procedure(s):  Left lower leg four compartment fasciotomy   Surgeon(s): Surgeon(s) and Role:     * Terry Vazquez MD - Primary     * Navin Corral PA-C - Assisting   Estimated blood loss: 20 cc    Specimens: * No specimens in log *   Findings: See post op note     Plan:  Same Day surgery discharge to home once criteria met.  CAM boot to remain on left  lower extremity and WBAT.  Norco for pain.  No dressing change on own.  Leave dressing on until 2 weeks follow up appointment.  F/U in clinic in 2 weeks    I was asked by Dr. Vazquez to assist with surgery. I positioned and prepped the patient. I retracted soft tissue.   I suctioned fluids when needed. I provided traction for dissection. I helped to ligate blood vessels. I helped Dr. Vazquez identify and protect important structures. The procedure was medically necessary for an assistant because Dr. Vazquez needed the operative exposure and assistance that I provided. This allowed him to safely and efficiently operate. It was also important that I help ligate blood vessels to maintain hemostasis and reduce the bleeding risk. I helped with the closure of the operative incisions as well as helping with the boot/cast/splint.  The assistance that I provided reduced operative time which meant less general anesthetic for the patient. No qualified residents were available to assist.    Navin Corral PA-C

## 2023-12-06 NOTE — ANESTHESIA CARE TRANSFER NOTE
Patient: Binu Sunshine    Procedure: Procedure(s):  Left lower leg four compartment fasciotomy       Diagnosis: Exertional compartment syndrome of left lower extremity [M79.A22]  Diagnosis Additional Information: No value filed.    Anesthesia Type:   General     Note:    Oropharynx: oropharynx clear of all foreign objects and spontaneously breathing  Level of Consciousness: drowsy  Oxygen Supplementation: nasal cannula  Level of Supplemental Oxygen (L/min / FiO2): 3  Independent Airway: airway patency satisfactory and stable  Dentition: dentition unchanged  Vital Signs Stable: post-procedure vital signs reviewed and stable  Report to RN Given: handoff report given  Patient transferred to: PACU    Handoff Report: Identifed the Patient, Identified the Reponsible Provider, Reviewed the pertinent medical history, Discussed the surgical course, Reviewed Intra-OP anesthesia mangement and issues during anesthesia, Set expectations for post-procedure period and Allowed opportunity for questions and acknowledgement of understanding      Vitals:  Vitals Value Taken Time   /61 12/06/23 0957   Temp     Pulse 64 12/06/23 1000   Resp 10 12/06/23 1000   SpO2 98 % 12/06/23 1000   Vitals shown include unfiled device data.    Electronically Signed By: SERENA Lopez CRNA  December 6, 2023  10:01 AM

## 2023-12-11 ENCOUNTER — TELEPHONE (OUTPATIENT)
Dept: ORTHOPEDICS | Facility: CLINIC | Age: 28
End: 2023-12-11
Payer: COMMERCIAL

## 2023-12-11 NOTE — TELEPHONE ENCOUNTER
[facility] Call Center    Phone Message    May a detailed message be left on voicemail: yes     Reason for Call: Other: Binu called she would like Ana to give her a call she wants to discuss her cam boot and if she should wear it at night. She also has some other questions.     Action Taken: Other: [facility]    Travel Screening: Not Applicable

## 2023-12-11 NOTE — CONFIDENTIAL NOTE
Returned call to patient. Reviewed post op restrictions. She will if she has any questions or concerns.    Tara Holter, RNCC

## 2023-12-21 ENCOUNTER — OFFICE VISIT (OUTPATIENT)
Dept: ORTHOPEDICS | Facility: CLINIC | Age: 28
End: 2023-12-21
Payer: COMMERCIAL

## 2023-12-21 DIAGNOSIS — M79.A22 EXERTIONAL COMPARTMENT SYNDROME OF LEFT LOWER EXTREMITY: Primary | ICD-10-CM

## 2023-12-21 DIAGNOSIS — Z98.890 STATUS POST SURGERY: ICD-10-CM

## 2023-12-21 PROCEDURE — 99207 PR NO CHARGE NURSE ONLY: CPT

## 2023-12-21 NOTE — PROGRESS NOTES
Reason for visit:    Binu Sunshine came in to the clinic for a two week post op check.    Her surgery was done 12/6/2023 by Dr Vazquez.  She had a left lower leg compartment fasciotomy.     Assessment:    Binu came into the clinic in a CAM boot Partial WB.    The Surgical wounds were exposed and found to be well-healed; so the sutures were removed. Skin was c/d/I. Steri strips were applied. Minimal swelling noted. Binu is doing very well.     Plan:     She was placed back into her CAM boot.  She was told she may be WBAT in normal shoes. Use the CAM boot for comfort purposes.   PT will be ordered and she may begin right away.     She has an appointment to see Dr. Vazquez at 6 weeks post op and at that time Dr. Vazquez will determine further restrictions.    All questions were answered. She has our phone number and will call with additional questions or problems.    Paola Lynn is the overseeing provider on site today.

## 2023-12-28 ENCOUNTER — THERAPY VISIT (OUTPATIENT)
Dept: PHYSICAL THERAPY | Facility: CLINIC | Age: 28
End: 2023-12-28
Payer: COMMERCIAL

## 2023-12-28 ENCOUNTER — TELEPHONE (OUTPATIENT)
Dept: ORTHOPEDICS | Facility: CLINIC | Age: 28
End: 2023-12-28

## 2023-12-28 DIAGNOSIS — Z98.890 STATUS POST SURGERY: ICD-10-CM

## 2023-12-28 DIAGNOSIS — M79.A22 EXERTIONAL COMPARTMENT SYNDROME OF LEFT LOWER EXTREMITY: ICD-10-CM

## 2023-12-28 PROCEDURE — 97161 PT EVAL LOW COMPLEX 20 MIN: CPT | Mod: GP | Performed by: PHYSICAL THERAPIST

## 2023-12-28 PROCEDURE — 97110 THERAPEUTIC EXERCISES: CPT | Mod: GP | Performed by: PHYSICAL THERAPIST

## 2023-12-28 NOTE — TELEPHONE ENCOUNTER
Hello,    I'm with ortho con.  Pt of Dr. Vazquez had surgery and is asking for Ana.  Please give the pt a call at .  Pt saw PT and she is being asked if she can wear compression stockings.  She has edema of the knee which causes pressure when walking.

## 2023-12-28 NOTE — CONFIDENTIAL NOTE
Attempted to return call to patient. LVM letting her know she could use ace bandage for compression. Clinic telephone provided if she has further questions or concerns.    Tara Holter, RNCC

## 2023-12-28 NOTE — PROGRESS NOTES
PHYSICAL THERAPY EVALUATION  Type of Visit: Evaluation    See electronic medical record for Abuse and Falls Screening details.    Subjective       Presenting condition or subjective complaint: exertional compartment surgery on 12/6/2023  Date of onset: 12/06/23    Relevant medical history:     Dates & types of surgery: sleal sling release, 4 compartment fasciaotomy    Prior diagnostic imaging/testing results: MRI (EMG)     Prior therapy history for the same diagnosis, illness or injury: No      Prior Level of Function  Transfers:   Ambulation:   ADL:   IADL:     Living Environment  Social support: With family members   Type of home: House; 2-story   Stairs to enter the home: Yes 2 Is there a railing: Yes   Ramp: No   Stairs inside the home: Yes 2 Is there a railing: No   Help at home: None  Equipment owned: [employer]     Employment: Yes (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) working out, walking, swimming  Hobbies/Interests:      Patient goals for therapy: return back to normal activities, walking, running, stand greater than one hour    Pain assessment: Pain present  See objective evaluation for additional pain details     Objective   FOOT/ANKLE EVALUATION  PAIN: Pain Level at Rest: 2/10  Pain is Exacerbated By: standing, walking,   Pain is Relieved By: cold  INTEGUMENTARY (edema, incisions): Figure 8: 51 cm 49.5 cm 4 inches down from inferior pole of patella  POSTURE:   GAIT:   Weightbearing Status:   Assistive Device(s):   Gait Deviations: Antalgic  Stride length decreased  BALANCE/PROPRIOCEPTION:   WEIGHT BEARING ALIGNMENT:   NON-WEIGHTBEARING ALIGNMENT:    ROM:   (Degrees) Left AROM Left PROM  Right AROM Right PROM   Ankle Dorsiflexion Lacks 10 degrees to neutral  8    Ankle Plantarflexion 30  35    Ankle Inversion No pain, difficult      Ankle Eversion Mild pain      Great Toe Flexion       Great Toe Extension       Pain:   End feel:     STRENGTH:   Pain: - none  + mild ++ moderate +++ severe  Strength Scale: 0-5/5 Left Right   Ankle Dorsiflexion 2+ 5   Ankle Plantarflexion 3 5   Ankle Inversion 3+ 5   Ankle Eversion 3+ 5   Great Toe Flexion     Great Toe Extension     Anterior Tibialis     Posterior Tibialis     Peroneals     Extensor Digitorum     Gastroc/Soleus       FLEXIBILITY:   SPECIAL TESTS:   FUNCTIONAL TESTS:   PALPATION:  mild tenderness over incisions and ankle where swelling is the greatest  JOINT MOBILITY:    Left Right   Tib-Fib Proximal     Tib-Fib Distal     Talocrural Hypomobile    Subtalar     Midtarsal     First Ray         Assessment & Plan   CLINICAL IMPRESSIONS  Medical Diagnosis: 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  The following significant findings have been identified: Pain, Decreased ROM/flexibility, Decreased joint mobility, Decreased strength, Inflammation, and Decreased activity tolerance. These impairments interfere with their ability to perform self care tasks, recreational activities, household chores, household mobility, and community mobility as compared to previous level of function.     Clinical Decision Making (Complexity):  Clinical Presentation: Stable/Uncomplicated  Clinical Presentation Rationale: based on medical and personal factors listed in PT evaluation  Clinical Decision Making (Complexity): Low complexity    PLAN OF CARE  Treatment Interventions:  Interventions: Manual Therapy, Neuromuscular Re-education, Therapeutic Activity, Therapeutic Exercise    Long Term Goals     PT Goal 1  Goal Identifier: ambulation  Goal Description: patient able to walk for 10 min with no c/o foot or lower extremity pain or swelling  Rationale: to maximize safety and independence with performance of ADLs and functional  tasks;to maximize safety and independence within the community  Goal Progress: just got off crutches on 12/27/2023  Target Date: 02/11/24      Frequency of Treatment: once a week  Duration of Treatment: 8 weeks    Recommended Referrals to Other Professionals:   Education Assessment:   Learner/Method: Patient;Listening;Reading;Demonstration;Pictures/Video;No Barriers to Learning    Risks and benefits of evaluation/treatment have been explained.   Patient/Family/caregiver agrees with Plan of Care.     Evaluation Time:     PT Eval, Low Complexity Minutes (63016): 15       Signing Clinician: Nicolasa Caraballo, LILLI      [facility]                                                                                   OUTPATIENT PHYSICAL THERAPY      PLAN OF TREATMENT FOR OUTPATIENT REHABILITATION   Patient's Last Name, First Name, Binu Batres YOB: 1995   Provider's Name   [facility]   Medical Record No.  [id number]     Onset Date: 12/06/23  Start of Care Date: 12/28/23     Medical Diagnosis:  Exertional compartment syndrome of left lower extremity  Status post surgery      PT Treatment Diagnosis:  Exertional compartment syndrome of left lower extremity  Status post surgery Plan of Treatment  Frequency/Duration: once a week/ 8 weeks    Certification date from 12/28/23 to 03/26/24         See note for plan of treatment details and functional goals     Nicolasa Caraballo, PT                         I CERTIFY THE NEED FOR THESE SERVICES FURNISHED UNDER        THIS PLAN OF TREATMENT AND WHILE UNDER MY CARE     (Physician attestation of this document indicates review and certification of the therapy plan).              Referring Provider:  Terry Vazquez    Initial Assessment  See Epic Evaluation- Start of Care Date: 12/28/23

## 2024-01-04 ENCOUNTER — THERAPY VISIT (OUTPATIENT)
Dept: PHYSICAL THERAPY | Facility: CLINIC | Age: 29
End: 2024-01-04
Payer: COMMERCIAL

## 2024-01-04 DIAGNOSIS — M79.A22 EXERTIONAL COMPARTMENT SYNDROME OF LEFT LOWER EXTREMITY: Primary | ICD-10-CM

## 2024-01-04 PROCEDURE — 97140 MANUAL THERAPY 1/> REGIONS: CPT | Mod: GP | Performed by: PHYSICAL THERAPIST

## 2024-01-04 PROCEDURE — 97110 THERAPEUTIC EXERCISES: CPT | Mod: GP | Performed by: PHYSICAL THERAPIST

## 2024-01-09 ENCOUNTER — THERAPY VISIT (OUTPATIENT)
Dept: PHYSICAL THERAPY | Facility: CLINIC | Age: 29
End: 2024-01-09
Payer: COMMERCIAL

## 2024-01-09 ENCOUNTER — ANCILLARY PROCEDURE (OUTPATIENT)
Dept: ULTRASOUND IMAGING | Facility: CLINIC | Age: 29
End: 2024-01-09
Attending: PHYSICIAN ASSISTANT
Payer: COMMERCIAL

## 2024-01-09 ENCOUNTER — TELEPHONE (OUTPATIENT)
Dept: ORTHOPEDICS | Facility: CLINIC | Age: 29
End: 2024-01-09

## 2024-01-09 DIAGNOSIS — Z98.890 STATUS POST SURGERY: ICD-10-CM

## 2024-01-09 DIAGNOSIS — M79.A22 EXERTIONAL COMPARTMENT SYNDROME OF LEFT LOWER EXTREMITY: Primary | ICD-10-CM

## 2024-01-09 DIAGNOSIS — M79.A22 EXERTIONAL COMPARTMENT SYNDROME OF LEFT LOWER EXTREMITY: ICD-10-CM

## 2024-01-09 PROCEDURE — 93971 EXTREMITY STUDY: CPT | Mod: LT | Performed by: RADIOLOGY

## 2024-01-09 PROCEDURE — 97110 THERAPEUTIC EXERCISES: CPT | Mod: GP

## 2024-01-09 NOTE — CONFIDENTIAL NOTE
Call placed to patient. Reviewed prelim results of US. Encouraged her to continue elevation, ice, compression and OTC pain medications. Appointment scheduled with Rossana Shahid PA-C January 12th at 3:20. She will call in the meantime if her symptoms worsen or she has questions.    Tara Holter, RNCC

## 2024-01-09 NOTE — CONFIDENTIAL NOTE
Attempted to return call to patient. LVM requesting callback. Clinic number provided.    Tara Holter, RNCC

## 2024-01-09 NOTE — TELEPHONE ENCOUNTER
[facility] Call Center    Phone Message    May a detailed message be left on voicemail: yes     Reason for Call: Other: Pt is returning Ana's call from this AM.  Please contact Elizabetho to discuss her symptoms and concerns. Thank you.      Action Taken: Message routed to:  [facility] Ortho    Travel Screening: Not Applicable

## 2024-01-09 NOTE — PROGRESS NOTES
PLAN  Continue therapy per current plan of care.    Beginning/End Dates of Progress Note Reporting Period:  12/28/23 to 01/09/2024    Referring Provider:  Terry Vazquez       01/09/24 0500   Appointment Info   Signing clinician's name / credentials Zhane Suarez, PT, DPT   Total/Authorized Visits E+T(8)   Visits Used 3   Medical Diagnosis Exertional compartment syndrome of left lower extremity  Status post surgery   PT Tx Diagnosis Exertional compartment syndrome of left lower extremity  Status post surgery   Precautions/Limitations Has a hx of sciatica, left side and previous surgery   Other pertinent information arrived 14 minutes late   Quick Adds Certification   Progress Note/Certification   Start of Care Date 12/28/23   Onset of illness/injury or Date of Surgery 12/06/23   Therapy Frequency once a week   Predicted Duration 8 weeks   Certification date from 12/28/23   Certification date to 03/26/24   Progress Note Due Date 03/26/24   Progress Note Completed Date 12/28/23   PT Goal 1   Goal Identifier ambulation   Goal Description walked for 30 min in store   Rationale to maximize safety and independence with performance of ADLs and functional tasks;to maximize safety and independence within the community   Goal Progress some discomfort with ambulation, no crutches   Target Date 02/11/24   Subjective Report   Subjective Report Within the last day or so began having increased calf pain and swelling in L calf after standing for 45 minutes. Feels like it was the pain she had prior to surgery. Is having numbness over 4th and 5th toes.   Objective Measures   Objective Measures Objective Measure 1;Objective Measure 2;Objective Measure 3   Objective Measure 1   Objective Measure gait   Details decreased toe off, improved DF with swing.   Objective Measure 2   Objective Measure knee to wall L.   Details 5cm knee to wall   Objective Measure 3   Objective Measure edema   Details pitting edema L lateral foot    Treatment Interventions (PT)   Interventions Therapeutic Procedure/Exercise;Therapeutic Activity;Neuromuscular Re-education;Manual Therapy   Therapeutic Procedure/Exercise   Therapeutic Procedures: strength, endurance, ROM, flexibillity minutes (82213) 27   Ther Proc 1 HEP   Ther Proc 1 - Details modification to decrease sciatic pain with HEP and increase glute strength   PTRx Ther Proc 1 Toe Raises   PTRx Ther Proc 1 - Details verbal review   PTRx Ther Proc 2 Standing Gastroc Stretch   PTRx Ther Proc 2 - Details verbal review   PTRx Ther Proc 3 Standing Soleus Stretch   PTRx Ther Proc 3 - Details verbal review   PTRx Ther Proc 4 Ankle Pumps in Long Sitting   PTRx Ther Proc 4 - Details verbal review   PTRx Ther Proc 5 Lateral Stepdown with Neutral Trunk Position   PTRx Ther Proc 5 - Details having increased sciatic/piriformis pain with exercise.    PTRx Ther Proc 6 Squat   PTRx Ther Proc 6 - Details verbal review reports is going well.    PTRx Ther Proc 7 Balance Single Leg Stance Supported and Unsupported   PTRx Ther Proc 7 - Details ~10s unsupported. Cueing for increased glute engagement with stance for strengthening.    PTRx Ther Proc 8 Towel Stretch Gastroc   PTRx Ther Proc 8 - Details No Notes   PTRx Ther Proc 9 Stair Step Ups   PTRx Ther Proc 9 - Details cueing for improved eccentric control. Alternative exercise for lateral step ups d/t pain pt has been having    PTRx Ther Proc 10 Standing Fire Hydrant without Support   PTRx Ther Proc 10 - Details instruction on exercise and cueing for proper angle of hip extension. Goal of increasing glute strength   Skilled Intervention verbal instruction and tactile cues   Patient Response/Progress understanding, no increase in pain   Therapeutic Activity   PTRx Ther Act 1 Education Sheet General   PTRx Ther Act 1 - Details wear light compression sockcan lightly massage from foot to knee for swelling clear knee/lower leg first then foot   Education   Learner/Method  Patient;Listening;Reading;Demonstration;Pictures/Video;No Barriers to Learning   Plan   Home program see ptrx and printout   Plan for next session progress functional strengthening   Comments   Comments increased swelling and calf pain, discussed reaching out to medical team today. Pt called and left message for team.   Total Session Time   Timed Code Treatment Minutes 27   Total Treatment Time (sum of timed and untimed services) 27

## 2024-01-09 NOTE — CONFIDENTIAL NOTE
Returned call to patient. She had a left lower leg 4 compartment fasciotomy with Dr. Vazquez on December 6th and is experiencing calf pain and edema. She has tried ice, elevation, and compression with no improvement in her edema. PT measured her calf today and it was larger than her right calf, there is no warmth or redness. Denies CP or SOB. She describes the pain in her calf as tight. She is not taking pain medications. She reports her incisions are healed. Writer will discuss with PA of the day and update patient on recommended steps.     Discussed with Rossana Shahid. Stat US to rule out DVT. US scheduled today at 1:00 PM. Called patient to relay appointment time and clinic address.    Tara Holter, RNCC

## 2024-01-09 NOTE — TELEPHONE ENCOUNTER
[facility] Call Center    Phone Message    May a detailed message be left on voicemail: yes     Reason for Call: Pt has pitted edema on left leg, ankle, and foot area and it's not getting better also return of calf pain, would like a call back regarding this       Action Taken: Other: [facility]    Travel Screening: Not Applicable

## 2024-01-12 ENCOUNTER — OFFICE VISIT (OUTPATIENT)
Dept: ORTHOPEDICS | Facility: CLINIC | Age: 29
End: 2024-01-12
Payer: COMMERCIAL

## 2024-01-12 DIAGNOSIS — Z98.890 STATUS POST SURGERY: Primary | ICD-10-CM

## 2024-01-12 DIAGNOSIS — M79.A22 EXERTIONAL COMPARTMENT SYNDROME OF LEFT LOWER EXTREMITY: ICD-10-CM

## 2024-01-12 PROCEDURE — 99024 POSTOP FOLLOW-UP VISIT: CPT | Performed by: PHYSICIAN ASSISTANT

## 2024-01-12 NOTE — NURSING NOTE
Reason For Visit:   Chief Complaint   Patient presents with    Surgical Followup     having increased pain/edema DOS 12/6/23 / L lower extremity four compartment fasciotomy (Vazquez)           LMP 11/11/2023 (Exact Date)     Pain Assessment  Patient Currently in Pain: Yes  0-10 Pain Scale: 2  Primary Pain Location: Leg (left)  Aggravating Factors: Walking, Standing    Hilary Engel ATC

## 2024-01-12 NOTE — LETTER
2024     Seen today: Yes    Patient:  Binu Sunshine  :   1995  MRN:     [id number]  Physician: NILAY CARDOZA    Binu Sunshine may not return to work until Date: 23.      The next clinic appointment is scheduled for (date/time) 24 with Dr. Vazquez and he will reevaluate her work status at this time.        Electronically signed by Nilay Cardoza PA-C

## 2024-01-12 NOTE — PROGRESS NOTES
ASSESSMENT/PLAN:  Binu Sunshine is a 28 year old who is status post left lower leg 4 compartment fasciotomy for exertional compartment syndrome with Dr. Vazquez on 12/6/23.    Called clinic earlier this week complaining of ongoing swelling.  She was sent for ultrasound which was negative for DVT.  Today, she states her swelling has gone down.  She continues to have pain with prolonged standing.  Her compartments feel soft.  We discussed management of pain and swelling with good consistent compression use and she was provided with compression sleeves today.  She will take these off at night for skin rest.  We also discussed going back into her cam boot during the day for ambulation to allow for rest of her compartments.  She will keep her follow-up with Dr. Rajan in 2 weeks for recheck and reevaluation.  She was given continued work restrictions until that time.    The patient will see Dr. Vazquez as scheduled on 1/23/24 for recheck. Binu has our clinic number and will call with any questions or concerns.    Rossana Shahid PA-C  Orthopaedic Surgery    _________________________________    HISTORY OF PRESENT ILLNESS:  Binu Sunshine is a 28 year old female who is approximately 6 weeks status post the above procedure.    She presents today for recheck following increased pain and swelling over the last.  She was sent for an ultrasound 1/9/2024 which was negative for DVT.  There was no comment or findings of any fluid collections.  She states in the last 24 to 48 hours, swelling has gone down.  She has a sensation of feeling like her leg is restless.  It is difficult for her to describe describe the pain.  She notes swelling onset that she is standing for 45 minutes to an hour.  She denies any fevers, chills or sweats.  Denies any redness or issues with her incisions which have been well-healed for a while.  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  This involves a lot of standing and  walking.    MEDICATIONS:   Current Outpatient Rx   Medication Sig Dispense Refill    HYDROcodone-acetaminophen (NORCO) 5-325 MG tablet Take 1-2 tablets by mouth every 4 hours as needed for moderate to severe pain 15 tablet 0    hydrOXYzine HCl (ATARAX) 25 MG tablet Take 1 tablet (25 mg) by mouth 3 times daily as needed for itching, anxiety or other (pain) 15 tablet 0    SUMAtriptan (IMITREX) 5 MG/ACT nasal spray At first sign of headache, may administer 1 spray in each nostril (2 sprays total). May repeat this once 2 hours later if needed. Maximum 2x per day.           ALLERGIES: Patient has no known allergies.       PHYSICAL EXAMINATION:   On physical examination the patient is comfortable and is in no acute distress. The affect is appropriate and breathing is non-labored.    Exam of the left lower extremity demonstrates all surgical incisions are well-healed scars.  No erythema or localized swelling appreciated.  The lower leg compartments are diffusely soft and nontender to palpation.  Trace swelling is noted at the ankle.  Dorsal foot veins are visible.  No increased pain with passive range of motion of the tibiotalar or subtalar joints.  No increased pain with passive range of motion of the toes.  Motor intact distally throughout the tibial and peroneal nerve distributions, 5/5 strength with tibialis anterior, gastrocnemius and soleus, EHL, FHL firing  Sensation intact to light touch throughout superficial peroneal, deep peroneal, tibial, saphenous, and sural nerves  Dorsalis pedis and posterior tibial pulses palpable, toes warm and well-perfused.    ULTRASOUND LOWER EXTREMITY VENOUS DUPLEX LEFT 1/9/2024 1:07 PM   FINDINGS: Right common femoral vein is patent, fully compressible, and  demonstrates normal phasic Doppler waveform.     Left common femoral, femoral, and popliteal veins are fully  compressible, patent, and demonstrate normal phasic Doppler waveforms.     Left posterior tibial veins are fully  "compressible to the ankle.     Left peroneal veins are fully compressible to the distal calf.                                                                      IMPRESSION: No deep venous thrombosis demonstrated in the LEFT leg.     Reference: \"Duplex Ultrasound in the Diagnosis of Lower-Extremity Deep  Venous Thrombosis\"- Arabella Gee MD, S; Francesco Fulton MD  (Circulation. 2014;129:917-921. http://circ.ahajournals.org)     I have personally reviewed the examination and initial interpretation  and I agree with the findings.     BRII GUZMAN MD     "

## 2024-01-12 NOTE — LETTER
1/12/2024         RE: Binu Sunshine  [address]  [facility] [address]        Dear Colleague,    Thank you for referring your patient, Binu Sunshine, to the [facility] [place]. Please see a copy of my visit note below.    ASSESSMENT/PLAN:  Binu Sunshine is a 28 year old who is status post left lower leg 4 compartment fasciotomy for exertional compartment syndrome with Dr. Vazquez on 12/6/23.    Called clinic earlier this week complaining of ongoing swelling.  She was sent for ultrasound which was negative for DVT.  Today, she states her swelling has gone down.  She continues to have pain with prolonged standing.  Her compartments feel soft.  We discussed management of pain and swelling with good consistent compression use and she was provided with compression sleeves today.  She will take these off at night for skin rest.  We also discussed going back into her cam boot during the day for ambulation to allow for rest of her compartments.  She will keep her follow-up with Dr. Rajan in 2 weeks for recheck and reevaluation.  She was given continued work restrictions until that time.    The patient will see Dr. Vazquez as scheduled on 1/23/24 for recheck. Binu has our clinic number and will call with any questions or concerns.    Rossana Shahid PA-C  Orthopaedic Surgery    _________________________________    HISTORY OF PRESENT ILLNESS:  Binu Sunshine is a 28 year old female who is approximately 6 weeks status post the above procedure.    She presents today for recheck following increased pain and swelling over the last.  She was sent for an ultrasound 1/9/2024 which was negative for DVT.  There was no comment or findings of any fluid collections.  She states in the last 24 to 48 hours, swelling has gone down.  She has a sensation of feeling like her leg is restless.  It is difficult for her to describe describe the pain.  She notes swelling onset that she  is standing for 45 minutes to an hour.  She denies any fevers, chills or sweats.  Denies any redness or issues with her incisions which have been well-healed for a while.  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    MEDICATIONS:   Current Outpatient Rx   Medication Sig Dispense Refill    HYDROcodone-acetaminophen (NORCO) 5-325 MG tablet Take 1-2 tablets by mouth every 4 hours as needed for moderate to severe pain 15 tablet 0    hydrOXYzine HCl (ATARAX) 25 MG tablet Take 1 tablet (25 mg) by mouth 3 times daily as needed for itching, anxiety or other (pain) 15 tablet 0    SUMAtriptan (IMITREX) 5 MG/ACT nasal spray At first sign of headache, may administer 1 spray in each nostril (2 sprays total). May repeat this once 2 hours later if needed. Maximum 2x per day.           ALLERGIES: Patient has no known allergies.       PHYSICAL EXAMINATION:   On physical examination the patient is comfortable and is in no acute distress. The affect is appropriate and breathing is non-labored.    Exam of the left lower extremity demonstrates all surgical incisions are well-healed scars.  No erythema or localized swelling appreciated.  The lower leg compartments are diffusely soft and nontender to palpation.  Trace swelling is noted at the ankle.  Dorsal foot veins are visible.  No increased pain with passive range of motion of the tibiotalar or subtalar joints.  No increased pain with passive range of motion of the toes.  Motor intact distally throughout the tibial and peroneal nerve distributions, 5/5 strength with tibialis anterior, gastrocnemius and soleus, EHL, FHL firing  Sensation intact to light touch throughout superficial peroneal, deep peroneal, tibial, saphenous, and sural nerves  Dorsalis pedis and posterior tibial pulses palpable, toes warm and well-perfused.    ULTRASOUND LOWER EXTREMITY VENOUS DUPLEX LEFT 1/9/2024 1:07 PM   FINDINGS: Right common  "femoral vein is patent, fully compressible, and  demonstrates normal phasic Doppler waveform.     Left common femoral, femoral, and popliteal veins are fully  compressible, patent, and demonstrate normal phasic Doppler waveforms.     Left posterior tibial veins are fully compressible to the ankle.     Left peroneal veins are fully compressible to the distal calf.                                                                      IMPRESSION: No deep venous thrombosis demonstrated in the LEFT leg.     Reference: \"Duplex Ultrasound in the Diagnosis of Lower-Extremity Deep  Venous Thrombosis\"- Arabella Gee MD, S; Francesco Fluton MD  (Circulation. 2014;129:917-921. http://circ.ahajournals.org)     I have personally reviewed the examination and initial interpretation  and I agree with the findings.     MD Rossana BUENROSTRO PA-C  "

## 2024-01-16 ENCOUNTER — THERAPY VISIT (OUTPATIENT)
Dept: PHYSICAL THERAPY | Facility: CLINIC | Age: 29
End: 2024-01-16
Payer: COMMERCIAL

## 2024-01-16 DIAGNOSIS — M79.A22 EXERTIONAL COMPARTMENT SYNDROME OF LEFT LOWER EXTREMITY: Primary | ICD-10-CM

## 2024-01-16 PROCEDURE — 97140 MANUAL THERAPY 1/> REGIONS: CPT | Mod: GP

## 2024-01-16 PROCEDURE — 97112 NEUROMUSCULAR REEDUCATION: CPT | Mod: GP

## 2024-01-16 NOTE — PROGRESS NOTES
PLAN  Continue therapy per current plan of care.    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  Status post surgery   Precautions/Limitations Has a hx of sciatica, left side and previous surgery   Quick Adds Certification   Progress Note/Certification   Start of Care Date 12/28/23   Onset of illness/injury or Date of Surgery 12/06/23   Therapy Frequency once a week   Predicted Duration 8 weeks   Certification date from 12/28/23   Certification date to 03/26/24   Progress Note Due Date 03/26/24   Progress Note Completed Date 12/28/23   PT Goal 1   Goal Identifier ambulation   Goal Description walked for 30 min in store   Rationale to maximize safety and independence with performance of ADLs and functional tasks;to maximize safety and independence within the community   Goal Progress progressing   Target Date 02/11/24   Subjective Report   Subjective Report Was told to keep compression sock on and boot on when on her feet alot. Is having increased swelling when on her feet. Was cleared for DVT.   Objective Measures   Objective Measures Objective Measure 1;Objective Measure 2;Objective Measure 3   Objective Measure 1   Objective Measure gait   Details improved gait mechanics, improved heel strike, decreased antalgic gait   Objective Measure 2   Objective Measure SL balance   Details ~20s hold with UE support   Objective Measure 3   Objective Measure edema   Details pitting edema L lateral foot   Treatment Interventions (PT)   Interventions Therapeutic Procedure/Exercise;Therapeutic Activity;Neuromuscular  Re-education;Manual Therapy   Therapeutic Activity   PTRx Ther Act 1 Education Sheet General   PTRx Ther Act 1 - Details wear light compression sockcan lightly massage from foot to knee for swelling clear knee/lower leg first then foot   Neuromuscular Re-education   PTRx Neuro Re-ed 1 Forward/lateral hops x4 each.   PTRx Neuro Re-ed 1 - Details fatigue and decreased push off L LE. Pt demo's decreased plyometric ability overall, reports feeling appropriately fatigued.   Neuro Re-ed 2 Double leg to SL land   Neuro Re-ed 2 - Details x2 through ladder, decreased stability and control with SL landing on L LE. Cueing to stick landing, land softly.   Neuro Re-ed 3 zig zag   Neuro Re-ed 3 - Details zig zag through ladder, x4, decreased coordination/speed improved with practice.   Neuromuscular re-ed of mvmt, balance, coord, kinesthetic sense, posture, proprioception minutes (11940) 27   Neuro Re-ed 1 agility ladder   Neuro Re-ed 1 - Details to improve coordination and neuromuscular control.   Skilled Intervention intervention selection   Patient Response/Progress fatigue, no adverse response   Neuromuscular Re-education Neuro Re-ed 2;Neuro Re-ed 3   Manual Therapy   Manual Therapy: Mobilization, MFR, MLD, friction massage minutes (69892) 13   Manual Therapy 1 lymphatic massage   Manual Therapy 1 - Details Massage starting at proximal thigh, working distal to foot to decrease swelling. Pitting edema noted in lateral L foot.   Patient Response/Progress no adverse response.   Education   Learner/Method Patient;Listening;Reading;Demonstration;Pictures/Video;No Barriers to Learning   Plan   Home program see ptrx and printout   Plan for next session progress impact/agility   Comments   Comments cleared of DVT. Instructed to wear compression sock and boot when swelling is bad.   Total Session Time   Timed Code Treatment Minutes 40   Total Treatment Time (sum of timed and untimed services) 40

## 2024-01-23 ENCOUNTER — OFFICE VISIT (OUTPATIENT)
Dept: ORTHOPEDICS | Facility: CLINIC | Age: 29
End: 2024-01-23
Payer: COMMERCIAL

## 2024-01-23 DIAGNOSIS — M25.572 PAIN IN JOINT, ANKLE AND FOOT, LEFT: Primary | ICD-10-CM

## 2024-01-23 PROCEDURE — 99024 POSTOP FOLLOW-UP VISIT: CPT | Performed by: ORTHOPAEDIC SURGERY

## 2024-01-23 NOTE — NURSING NOTE
Reason For Visit:   Chief Complaint   Patient presents with    Surgical Followup     6 wk post-op left lower leg four compartment fasciotomy DOS 12/6/23       28 year old  1995      There were no vitals taken for this visit.    Pain Assessment  Patient Currently in Pain: Yes  0-10 Pain Scale: 3  Primary Pain Location: Foot (left)      Oliverio Ruiz ATC

## 2024-01-23 NOTE — PROGRESS NOTES
Chief complaint: Status post left lower leg 4 compartment fasciotomy performed December 6, 2023    Patient presents today for further follow-up.  Reports to be doing well reports to be somewhat concerned about the swelling and she was instructed to place herself back into the cam walker.    Patient reports to have been done some exercises but not to have been fully active yet.    On today's exam she presents with full range of motion of the left ankle hindfoot and midfoot joint seems intact skin is intact patient with well-healed surgical incisions.    Assessment: Status post left lower leg 4 compartment fasciotomy    Plan: I discussed with the patient to proceed with activity as tolerated please continue the use of the cam walker as soon as possible and to proceed with the use of a compression stocking.    Patient will follow-up on as needed basis.  All questions were answered.  I encouraged the patient to visit with us in 2 to 3 months from now she is not happy the function of her left lower leg.

## 2024-01-23 NOTE — LETTER
1/23/2024         RE: Binu Sunshine  [address]  [facility] [address]        Dear Colleague,    Thank you for referring your patient, Binu Sunshine, to the [facility] [place]. Please see a copy of my visit note below.    Chief complaint: Status post left lower leg 4 compartment fasciotomy performed December 6, 2023    Patient presents today for further follow-up.  Reports to be doing well reports to be somewhat concerned about the swelling and she was instructed to place herself back into the cam walker.    Patient reports to have been done some exercises but not to have been fully active yet.    On today's exam she presents with full range of motion of the left ankle hindfoot and midfoot joint seems intact skin is intact patient with well-healed surgical incisions.    Assessment: Status post left lower leg 4 compartment fasciotomy    Plan: I discussed with the patient to proceed with activity as tolerated please continue the use of the cam walker as soon as possible and to proceed with the use of a compression stocking.    Patient will follow-up on as needed basis.  All questions were answered.  I encouraged the patient to visit with us in 2 to 3 months from now she is not happy the function of her left lower leg.          Terry Vazquez MD

## 2024-02-06 ENCOUNTER — THERAPY VISIT (OUTPATIENT)
Dept: PHYSICAL THERAPY | Facility: CLINIC | Age: 29
End: 2024-02-06
Payer: COMMERCIAL

## 2024-02-06 DIAGNOSIS — M79.A22 EXERTIONAL COMPARTMENT SYNDROME OF LEFT LOWER EXTREMITY: Primary | ICD-10-CM

## 2024-02-06 PROCEDURE — 97140 MANUAL THERAPY 1/> REGIONS: CPT | Mod: GP

## 2024-02-06 PROCEDURE — 97530 THERAPEUTIC ACTIVITIES: CPT | Mod: GP

## 2024-02-07 DIAGNOSIS — M79.A22 EXERTIONAL COMPARTMENT SYNDROME OF LEFT LOWER EXTREMITY: ICD-10-CM

## 2024-02-07 DIAGNOSIS — R60.0 LOCALIZED EDEMA: Primary | ICD-10-CM

## 2024-02-07 DIAGNOSIS — Z98.890 STATUS POST SURGERY: ICD-10-CM

## 2024-02-08 DIAGNOSIS — Z98.890 STATUS POST SURGERY: ICD-10-CM

## 2024-02-08 DIAGNOSIS — R60.0 LOCALIZED EDEMA: Primary | ICD-10-CM

## 2024-02-08 DIAGNOSIS — M79.A22 EXERTIONAL COMPARTMENT SYNDROME OF LEFT LOWER EXTREMITY: ICD-10-CM

## 2024-02-13 ENCOUNTER — THERAPY VISIT (OUTPATIENT)
Dept: PHYSICAL THERAPY | Facility: CLINIC | Age: 29
End: 2024-02-13
Payer: COMMERCIAL

## 2024-02-13 DIAGNOSIS — M79.A22 EXERTIONAL COMPARTMENT SYNDROME OF LEFT LOWER EXTREMITY: Primary | ICD-10-CM

## 2024-02-13 PROCEDURE — 97530 THERAPEUTIC ACTIVITIES: CPT | Mod: GP

## 2024-02-13 PROCEDURE — 97110 THERAPEUTIC EXERCISES: CPT | Mod: GP

## 2024-02-21 ENCOUNTER — THERAPY VISIT (OUTPATIENT)
Dept: OCCUPATIONAL THERAPY | Facility: CLINIC | Age: 29
End: 2024-02-21
Attending: ORTHOPAEDIC SURGERY
Payer: COMMERCIAL

## 2024-02-21 DIAGNOSIS — I89.0 LYMPHEDEMA: Primary | ICD-10-CM

## 2024-02-21 DIAGNOSIS — M79.A22 EXERTIONAL COMPARTMENT SYNDROME OF LEFT LOWER EXTREMITY: ICD-10-CM

## 2024-02-21 DIAGNOSIS — Z98.890 STATUS POST SURGERY: ICD-10-CM

## 2024-02-21 PROCEDURE — 97140 MANUAL THERAPY 1/> REGIONS: CPT | Mod: GO

## 2024-02-21 PROCEDURE — 97165 OT EVAL LOW COMPLEX 30 MIN: CPT | Mod: GO

## 2024-02-21 NOTE — PROGRESS NOTES
OCCUPATIONAL THERAPY EVALUATION  Type of Visit: Evaluation    See electronic medical record for Abuse and Falls Screening details.    Subjective      Presenting condition or subjective complaint:  persistent LLE swelling following surgery  Date of onset: 02/08/24    Relevant medical history:     Dates & types of surgery:  Left soleus muscle release 11/4/23 with no improvement on symptoms. Left lower leg four compartment fasciotomy 12/6/23    Prior diagnostic imaging/testing results:     US neg for DVT  Prior therapy history for the same diagnosis, illness or injury: No      Prior Level of Function  Transfers: Independent  Ambulation: Independent  ADL: Independent  IADL: independent with all    Living Environment  Social support: With a significant other or spouse   Type of home: House; 2-story   Stairs to enter the home: Yes 2 Is there a railing: Yes   Ramp: No   Stairs inside the home: Yes 2 Is there a railing: Yes   Help at home: Home management tasks (cooking, cleaning); Home and Yard maintenance tasks  Equipment owned:       Employment: Yes MA  Hobbies/Interests: Soccer, walking, lifting, hiking    Patient goals for therapy:      Pain assessment:  when most swollen, 5/10 at the end of the day     Objective       EDEMA EVALUATION  Additional history:  Body part affected by edema: Left lower leg  If cancer related, treatment:    If not cancer related, problems with veins or cause of swelling: Post op for fasciotomy  Distance able to walk: 2+ miles if swelling isn't bad.  Time able to stand: 30-45 minutes  Sensation problems in hands/feet: Yes    Edema etiology: Surgery      Cognitive Status Examination  Orientation: Oriented to person, place and time   Level of Consciousness: Alert  Follows Commands and Answers Questions: 100% of the time  Personal Safety and Judgement: Intact  Memory: Intact    EDEMA  Skin Condition: Intact, Non-pitting from toes to knee crease  Scar: Yes, healed flat with no  adhesion  Ulceration: No    GIRTH MEASUREMENTS: Refer to separate girth measurement flowsheet.     VOLUME LE  Right LE (mL) 2347   Left LE (mL) 2524   LE Volume Comparison LLE is larger than RLE   % Difference 7.2   Head/Neck Volume     Trunk Volume    Genital Volume       RANGE OF MOTION: LE ROM WFL  STRENGTH:  LLE working on increasing strength post surgery  PALPATION: non pitting edema   ACTIVITIES OF DAILY LIVING: indep  BED MOBILITY: Independent  TRANSFERS: Independent  GAIT/LOCOMOTION: indep with no device  BALANCE: WNL  SENSATION: LE Sensation WNL    Assessment & Plan   CLINICAL IMPRESSIONS  Medical Diagnosis: R60.0 (ICD-10-CM) - Localized edema  Z98.890 (ICD-10-CM) - Status post surgery  M79.A22 (ICD-10-CM) - Exertional compartment syndrome of left lower extremity    Treatment Diagnosis: LLE swelling    Impression/Assessment: Pt is a 28 year old female presenting to Occupational Therapy due to LLE swelling post surgery that is not resolving.  The following significant findings have been identified: Impaired activity tolerance, Impaired ROM, and Pain.  These identified deficits interfere with their ability to perform household chores, household mobility, and community mobility as compared to previous level of function.     Clinical Decision Making (Complexity):  Assessment of Occupational Performance: 1-3 Performance Deficits  Occupational Performance Limitations: functional mobility, driving and community mobility, and home establishment and management  Clinical Decision Making (Complexity): Low complexity    PLAN OF CARE  Treatment Interventions:  Interventions: Self-Care/Home Management, Therapeutic Activity, Therapeutic Exercise, Gradient Compression Bandaging, Manual Therapy    Long Term Goals   OT Goal 1  Goal Identifier: Volume  Goal Description: Pt will demonstrate a reduction of at least 200 mL in LLE to improve skin integrity, safety with mobility and fit of clothing/shoes.  Rationale: In order to  maximize safety and independence with performance of self-care activities  Target Date: 05/15/24  OT Goal 2  Goal Identifier: Garments  Goal Description: Pt will be fit for and demonstrate independence using new compression garments for long term edema management as evidenced by a no more than 25% increase in volume after transitioning into garments.  Rationale: In order to maximize safety and independence with performance of self-care activities  Target Date: 05/15/24  OT Goal 3  Goal Identifier: Home management  Goal Description: Pt will verbalize understanding of long term management/prevention of lymphedema, including wear schedule for recommended compression garments, manual techniques, skin care, elevation and exercise.  Rationale: In order to maximize safety and independence with performance of self-care activities  Target Date: 05/15/24      Frequency of Treatment: 1x a week  Duration of Treatment: 12 weeks     Recommended Referrals to Other Professionals: none at this time  Education Assessment: Learner/Method: Patient;Listening;Reading;Demonstration;No Barriers to Learning     Risks and benefits of evaluation/treatment have been explained.   Patient/Family/caregiver agrees with Plan of Care.     Evaluation Time:    OT Eval, Low Complexity Minutes (77517): 15  Signing Clinician: Olamide Payne OT      [facility]                                                                                   OUTPATIENT OCCUPATIONAL THERAPY      PLAN OF TREATMENT FOR OUTPATIENT REHABILITATION   Patient's Last Name, First Name, Binu Batres YOB: 1995   Provider's Name   [facility]   Medical Record No.  [id number]     Onset Date: 02/08/24 Start of Care Date: 02/21/24     Medical Diagnosis:  R60.0 (ICD-10-CM) - Localized edema  Z98.890 (ICD-10-CM) - Status post surgery  M79.A22 (ICD-10-CM) - Exertional compartment syndrome of left  lower extremity      OT Treatment Diagnosis:  LLE swelling Plan of Treatment  Frequency/Duration:1x a week/12 weeks    Certification date from 02/21/24   To 05/15/24        See note for plan of treatment details and functional goals     Olamide Payne OT                         I CERTIFY THE NEED FOR THESE SERVICES FURNISHED UNDER        THIS PLAN OF TREATMENT AND WHILE UNDER MY CARE     (Physician attestation of this document indicates review and certification of the therapy plan).              Referring Provider:  Terry Vazquez    Initial Assessment  See Epic Evaluation- 02/21/24

## 2024-02-27 ENCOUNTER — THERAPY VISIT (OUTPATIENT)
Dept: PHYSICAL THERAPY | Facility: CLINIC | Age: 29
End: 2024-02-27
Payer: COMMERCIAL

## 2024-02-27 DIAGNOSIS — M79.A22 EXERTIONAL COMPARTMENT SYNDROME OF LEFT LOWER EXTREMITY: Primary | ICD-10-CM

## 2024-02-27 PROCEDURE — 97110 THERAPEUTIC EXERCISES: CPT | Mod: GP

## 2024-03-04 ENCOUNTER — THERAPY VISIT (OUTPATIENT)
Dept: OCCUPATIONAL THERAPY | Facility: CLINIC | Age: 29
End: 2024-03-04
Attending: ORTHOPAEDIC SURGERY
Payer: COMMERCIAL

## 2024-03-04 DIAGNOSIS — Z98.890 STATUS POST SURGERY: ICD-10-CM

## 2024-03-04 DIAGNOSIS — M79.A22 EXERTIONAL COMPARTMENT SYNDROME OF LEFT LOWER EXTREMITY: ICD-10-CM

## 2024-03-04 DIAGNOSIS — I89.0 LYMPHEDEMA: Primary | ICD-10-CM

## 2024-03-04 DIAGNOSIS — R60.0 LOCALIZED EDEMA: ICD-10-CM

## 2024-03-04 PROCEDURE — 97140 MANUAL THERAPY 1/> REGIONS: CPT | Mod: GO

## 2024-03-09 ENCOUNTER — HEALTH MAINTENANCE LETTER (OUTPATIENT)
Age: 29
End: 2024-03-09

## 2024-04-05 ENCOUNTER — THERAPY VISIT (OUTPATIENT)
Dept: OCCUPATIONAL THERAPY | Facility: CLINIC | Age: 29
End: 2024-04-05
Attending: ORTHOPAEDIC SURGERY
Payer: COMMERCIAL

## 2024-04-05 DIAGNOSIS — M79.A22 EXERTIONAL COMPARTMENT SYNDROME OF LEFT LOWER EXTREMITY: ICD-10-CM

## 2024-04-05 DIAGNOSIS — I89.0 LYMPHEDEMA: Primary | ICD-10-CM

## 2024-04-05 DIAGNOSIS — R60.0 LOCALIZED EDEMA: ICD-10-CM

## 2024-04-05 DIAGNOSIS — Z98.890 STATUS POST SURGERY: ICD-10-CM

## 2024-04-05 PROCEDURE — 97140 MANUAL THERAPY 1/> REGIONS: CPT | Mod: GO

## 2024-05-02 NOTE — PROGRESS NOTES
DISCHARGE  Reason for Discharge: No further expectation of progress in PT working with OT/lymphedema to manage swelling    Discharge Plan: Patient to continue home program.    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  Status post surgery   Precautions/Limitations Has a hx of sciatica, left side and previous surgery   Quick Adds Certification   Progress Note/Certification   Start of Care Date 12/28/23   Onset of illness/injury or Date of Surgery 12/06/23   Therapy Frequency once a week   Predicted Duration 8 weeks   Certification date from 12/28/23   Certification date to 03/26/24   Progress Note Due Date 03/26/24   Progress Note Completed Date 12/28/23   GOALS   PT Goals 2   PT Goal 1   Goal Identifier ambulation   Goal Description walked for 30 min in store   Rationale to maximize safety and independence with performance of ADLs and functional tasks;to maximize safety and independence within the community   Goal Progress able to walk 2-3 miles with no sytmpoms.   Target Date 02/11/24   Date Met 02/06/24   PT Goal 2   Goal Identifier strength   Goal Description patient will demonstrate 25 SL calf raises bilaterally to demonstrate 5/5 strength in PF's   Rationale to maximize safety and independence within the community;to maximize safety and independence with performance of ADLs and functional tasks   Goal Progress progressing, added in posterior tib bias   Target Date 04/06/24   Subjective Report   Subjective Report Went to lymphedema therapy, has L LE wrapped 24 hours a day currently. Will be following up next week to check progress- and for compression garments. Feels limited d/t bulk of  wrapping in the gym, but otherwise is confident with HEP and progressing towards d/c   Objective Measure 1   Objective Measure heels elevated sqaut   Details d/t bulk of edema wrap, good form   Objective Measure 2   Objective Measure calf raises   Details high fatigue in posterior tib with biased calf raise   Treatment Interventions (PT)   Interventions Therapeutic Procedure/Exercise;Therapeutic Activity;Neuromuscular Re-education;Manual Therapy   Therapeutic Procedure/Exercise   Therapeutic Procedures: strength, endurance, ROM, flexibility minutes (70953) 26   Ther Proc 1 POC and review of HEP   Ther Proc 1 - Details added posterior tib bias calf raise. Discussed POC, pt consistently goes to gym and understands rehab priciples for L calf musculature   PTRx Ther Proc 1 POC   PTRx Ther Proc 1 - Details significant time spent discussing progressing of HEP as able, returning to soccer, continue with walking, progress to running as able. Discussed being close to d/c as pt understands HEP and goals of thearpy.   PTRx Ther Proc 2 posterior tib   PTRx Ther Proc 2 - Details added posterior tib bias to HEP, cueing to keep 1st MTP in contact with ground to promote correct mechanics, x5 - fatigue.   Skilled Intervention skilled cueing, progression and intervention selection   Patient Response/Progress tolerated well, fatigue   Education   Learner/Method Patient;Listening;Reading;Demonstration;Pictures/Video;No Barriers to Learning   Plan   Home program see ptrx and printout   Plan for next session review of compression progress, d/c?   Comments   Comments cleared of DVT. Instructed to wear compression sock when swelling is bad. Lymphedema evaluation this week.   Total Session Time   Timed Code Treatment Minutes 26   Total Treatment Time (sum of timed and untimed services) 26

## 2024-06-03 PROBLEM — G44.001 INTRACTABLE CLUSTER HEADACHE SYNDROME: Status: ACTIVE | Noted: 2023-08-14

## 2024-06-03 PROBLEM — R29.898 LEFT LEG WEAKNESS: Status: ACTIVE | Noted: 2020-06-11

## 2024-08-29 ENCOUNTER — THERAPY VISIT (OUTPATIENT)
Dept: OCCUPATIONAL THERAPY | Facility: CLINIC | Age: 29
End: 2024-08-29
Attending: ORTHOPAEDIC SURGERY
Payer: COMMERCIAL

## 2024-08-29 DIAGNOSIS — I89.0 LYMPHEDEMA: Primary | ICD-10-CM

## 2024-08-29 DIAGNOSIS — Z98.890 STATUS POST SURGERY: ICD-10-CM

## 2024-08-29 DIAGNOSIS — M79.A22 EXERTIONAL COMPARTMENT SYNDROME OF LEFT LOWER EXTREMITY: ICD-10-CM

## 2024-08-29 PROCEDURE — 97140 MANUAL THERAPY 1/> REGIONS: CPT | Mod: GO

## 2024-11-12 ENCOUNTER — OFFICE VISIT (OUTPATIENT)
Dept: ORTHOPEDICS | Facility: CLINIC | Age: 29
End: 2024-11-12
Payer: COMMERCIAL

## 2024-11-12 DIAGNOSIS — M25.572 PAIN IN JOINT, ANKLE AND FOOT, LEFT: Primary | ICD-10-CM

## 2024-11-12 PROCEDURE — 99213 OFFICE O/P EST LOW 20 MIN: CPT | Performed by: ORTHOPAEDIC SURGERY

## 2024-11-12 NOTE — LETTER
11/12/2024      Binu Sunshine  [address]  [place] [facility] [address]      Dear Colleague,    Thank you for referring your patient, Binu Sunshine, to the [facility] [place]. Please see a copy of my visit note below.    Chief complaint: Status post left lower leg 4 compartment fasciotomy performed December 6, 2023    Patient presents for further follow-up.  Patient reports to have still some symptoms however she is not very clear if the symptoms are related to the excessive swelling that she has been dealing with or a recurrence of the compartment syndrome.  She believes that the pain is different with a different presentation a different behavior.    Continues working with the lymphedema clinic and different devices to prevent her swelling from taking place.  Reports to start quite well first thing in the morning    On today's exam she presents with overall well-healed surgical incisions there is full range of motion of the ankle and the knee.  There is clearly a change in girth between the left and the right calf.    Assessment: Status post left lower leg 4 compartment fasciotomy with residual lymphedema    Plan: Discussed with the patient that based on her report they believe that the symptoms are more related to the swelling than a possible recurrence of the exertional compartment syndrome.    In spite of that working proceed without retesting of the compartment syndrome.  Patient will contact us when the testing has taken place and we will reassess the next steps.    In the meantime she has no restrictions.  All questions were answered.    TT 20 minutes      Again, thank you for allowing me to participate in the care of your patient.        Sincerely,        Terry Vazquez MD

## 2024-11-12 NOTE — PROGRESS NOTES
Chief complaint: Status post left lower leg 4 compartment fasciotomy performed December 6, 2023    Patient presents for further follow-up.  Patient reports to have still some symptoms however she is not very clear if the symptoms are related to the excessive swelling that she has been dealing with or a recurrence of the compartment syndrome.  She believes that the pain is different with a different presentation a different behavior.    Continues working with the lymphedema clinic and different devices to prevent her swelling from taking place.  Reports to start quite well first thing in the morning    On today's exam she presents with overall well-healed surgical incisions there is full range of motion of the ankle and the knee.  There is clearly a change in girth between the left and the right calf.    Assessment: Status post left lower leg 4 compartment fasciotomy with residual lymphedema    Plan: Discussed with the patient that based on her report they believe that the symptoms are more related to the swelling than a possible recurrence of the exertional compartment syndrome.    In spite of that working proceed without retesting of the compartment syndrome.  Patient will contact us when the testing has taken place and we will reassess the next steps.    In the meantime she has no restrictions.  All questions were answered.    TT 20 minutes

## 2024-11-12 NOTE — NURSING NOTE
Reason For Visit:   Chief Complaint   Patient presents with    RECHECK     Follow up left leg swelling and pain when walking. Status post left lower leg 4 compartment fasciotomy performed December 6, 2023         29 year old  1995      Primary MD: Terry Vazquez  Ref. MD: víctor      There were no vitals taken for this visit.    Pain Assessment  Patient Currently in Pain: Denies (no pain now)            Oliverio Ruiz ATC

## 2025-03-16 ENCOUNTER — HEALTH MAINTENANCE LETTER (OUTPATIENT)
Age: 30
End: 2025-03-16

## (undated) DEVICE — SU SILK 4-0 TIE 12X30" A303H

## (undated) DEVICE — ESU GROUND PAD ADULT W/CORD E7507

## (undated) DEVICE — GLOVE PROTEXIS BLUE W/NEU-THERA 6.5  2D73EB65

## (undated) DEVICE — DRSG KERLIX 4 1/2"X4YDS ROLL 6715

## (undated) DEVICE — SU VICRYL 3-0 SH 27" UND J416H

## (undated) DEVICE — DRSG TEGADERM 4X4 3/4" 1626W

## (undated) DEVICE — DECANTER VIAL 2006S

## (undated) DEVICE — PACK AV FISTULA

## (undated) DEVICE — SYR 10ML LL W/O NDL 302995

## (undated) DEVICE — GEL ULTRASOUND AQUASONIC 20GM 01-01

## (undated) DEVICE — GLOVE BIOGEL PI MICRO SZ 7.5 48575

## (undated) DEVICE — SPONGE LAP 18X18" X8435

## (undated) DEVICE — SU MONOCRYL 4-0 PS-2 27" UND Y426H

## (undated) DEVICE — LINEN ORTHO PACK 5446

## (undated) DEVICE — ESU PENCIL SMOKE EVAC W/ROCKER SWITCH 0703-047-000

## (undated) DEVICE — SUCTION MANIFOLD NEPTUNE 2 SYS 1 PORT 702-025-000

## (undated) DEVICE — DRAPE SHEET REV FOLD 3/4 9349

## (undated) DEVICE — GLOVE PROTEXIS W/NEU-THERA 6.5  2D73TE65

## (undated) DEVICE — LINEN TOWEL PACK X30 5481

## (undated) DEVICE — SOL NACL 0.9% IRRIG 500ML BOTTLE 2F7123

## (undated) DEVICE — CLIP HORIZON SM RED WIDE SLOT 001201

## (undated) DEVICE — PREP CHLORAPREP 26ML TINTED ORANGE  260815

## (undated) DEVICE — LINEN TOWEL PACK X6 WHITE 5487

## (undated) DEVICE — DRAPE IOBAN INCISE 23X17" 6650EZ

## (undated) DEVICE — SOL WATER IRRIG 1000ML BOTTLE 2F7114

## (undated) DEVICE — PACK LOWER EXTREMITY CUSTOM ASC

## (undated) DEVICE — PREP CHLORAPREP 26ML TINTED HI-LITE ORANGE 930815

## (undated) DEVICE — SU SILK 2-0 TIE 12X30" A305H

## (undated) DEVICE — IMM LEG ELEVATOR 79-90191

## (undated) DEVICE — BASIN SET SINGLE STERILE 13752-624

## (undated) DEVICE — ESU GROUND PAD ADULT REM W/15' CORD E7507DB

## (undated) DEVICE — GOWN XLG DISP 9545

## (undated) DEVICE — SOL NACL 0.9% IRRIG 1000ML BOTTLE 2F7124

## (undated) DEVICE — ESU ELEC BLADE 2.75" COATED/INSULATED E1455

## (undated) DEVICE — CLIP HORIZON MED BLUE 002200

## (undated) DEVICE — SU SILK 3-0 TIE 12X30" A304H

## (undated) DEVICE — DRAPE ISOLATION BAG 1003

## (undated) DEVICE — GLOVE BIOGEL PI MICRO INDICATOR UNDERGLOVE SZ 8.0 48980

## (undated) DEVICE — ADH SKIN CLOSURE PREMIERPRO EXOFIN 1.0ML 3470

## (undated) RX ORDER — CEFAZOLIN SODIUM 2 G/50ML
SOLUTION INTRAVENOUS
Status: DISPENSED
Start: 2023-12-06

## (undated) RX ORDER — OXYCODONE HYDROCHLORIDE 5 MG/1
TABLET ORAL
Status: DISPENSED
Start: 2022-11-04

## (undated) RX ORDER — LIDOCAINE HYDROCHLORIDE 20 MG/ML
INJECTION, SOLUTION EPIDURAL; INFILTRATION; INTRACAUDAL; PERINEURAL
Status: DISPENSED
Start: 2022-11-04

## (undated) RX ORDER — HYDROMORPHONE HCL IN WATER/PF 6 MG/30 ML
PATIENT CONTROLLED ANALGESIA SYRINGE INTRAVENOUS
Status: DISPENSED
Start: 2022-11-04

## (undated) RX ORDER — OXYCODONE HYDROCHLORIDE 5 MG/1
TABLET ORAL
Status: DISPENSED
Start: 2023-12-06

## (undated) RX ORDER — HYDROMORPHONE HYDROCHLORIDE 1 MG/ML
INJECTION, SOLUTION INTRAMUSCULAR; INTRAVENOUS; SUBCUTANEOUS
Status: DISPENSED
Start: 2023-12-06

## (undated) RX ORDER — BUPIVACAINE HYDROCHLORIDE 2.5 MG/ML
INJECTION, SOLUTION EPIDURAL; INFILTRATION; INTRACAUDAL
Status: DISPENSED
Start: 2022-11-04

## (undated) RX ORDER — BUPIVACAINE HYDROCHLORIDE 5 MG/ML
INJECTION, SOLUTION EPIDURAL; INTRACAUDAL
Status: DISPENSED
Start: 2023-12-06

## (undated) RX ORDER — BUPIVACAINE HYDROCHLORIDE AND EPINEPHRINE 2.5; 5 MG/ML; UG/ML
INJECTION, SOLUTION EPIDURAL; INFILTRATION; INTRACAUDAL; PERINEURAL
Status: DISPENSED
Start: 2022-11-04

## (undated) RX ORDER — FENTANYL CITRATE-0.9 % NACL/PF 10 MCG/ML
PLASTIC BAG, INJECTION (ML) INTRAVENOUS
Status: DISPENSED
Start: 2023-12-06

## (undated) RX ORDER — HYDROXYZINE HYDROCHLORIDE 25 MG/1
TABLET, FILM COATED ORAL
Status: DISPENSED
Start: 2023-12-06

## (undated) RX ORDER — FENTANYL CITRATE 50 UG/ML
INJECTION, SOLUTION INTRAMUSCULAR; INTRAVENOUS
Status: DISPENSED
Start: 2023-12-06

## (undated) RX ORDER — FENTANYL CITRATE 50 UG/ML
INJECTION, SOLUTION INTRAMUSCULAR; INTRAVENOUS
Status: DISPENSED
Start: 2022-11-04

## (undated) RX ORDER — ONDANSETRON 2 MG/ML
INJECTION INTRAMUSCULAR; INTRAVENOUS
Status: DISPENSED
Start: 2023-12-06

## (undated) RX ORDER — ONDANSETRON 2 MG/ML
INJECTION INTRAMUSCULAR; INTRAVENOUS
Status: DISPENSED
Start: 2022-11-04

## (undated) RX ORDER — FENTANYL CITRATE-0.9 % NACL/PF 10 MCG/ML
PLASTIC BAG, INJECTION (ML) INTRAVENOUS
Status: DISPENSED
Start: 2022-11-04

## (undated) RX ORDER — DEXAMETHASONE SODIUM PHOSPHATE 4 MG/ML
INJECTION, SOLUTION INTRA-ARTICULAR; INTRALESIONAL; INTRAMUSCULAR; INTRAVENOUS; SOFT TISSUE
Status: DISPENSED
Start: 2022-11-04

## (undated) RX ORDER — PROPOFOL 10 MG/ML
INJECTION, EMULSION INTRAVENOUS
Status: DISPENSED
Start: 2023-12-06

## (undated) RX ORDER — CEFAZOLIN SODIUM/WATER 2 G/20 ML
SYRINGE (ML) INTRAVENOUS
Status: DISPENSED
Start: 2022-11-04

## (undated) RX ORDER — BUPIVACAINE HYDROCHLORIDE AND EPINEPHRINE 5; 5 MG/ML; UG/ML
INJECTION, SOLUTION PERINEURAL
Status: DISPENSED
Start: 2022-11-04

## (undated) RX ORDER — PROPOFOL 10 MG/ML
INJECTION, EMULSION INTRAVENOUS
Status: DISPENSED
Start: 2022-11-04

## (undated) RX ORDER — ACETAMINOPHEN 325 MG/1
TABLET ORAL
Status: DISPENSED
Start: 2022-11-04

## (undated) RX ORDER — HEPARIN SODIUM 1000 [USP'U]/ML
INJECTION, SOLUTION INTRAVENOUS; SUBCUTANEOUS
Status: DISPENSED
Start: 2022-11-04